# Patient Record
Sex: FEMALE | Race: WHITE | NOT HISPANIC OR LATINO
[De-identification: names, ages, dates, MRNs, and addresses within clinical notes are randomized per-mention and may not be internally consistent; named-entity substitution may affect disease eponyms.]

---

## 2017-05-16 PROBLEM — Z00.00 ENCOUNTER FOR PREVENTIVE HEALTH EXAMINATION: Status: ACTIVE | Noted: 2017-05-16

## 2017-05-23 ENCOUNTER — APPOINTMENT (OUTPATIENT)
Dept: VASCULAR SURGERY | Facility: CLINIC | Age: 82
End: 2017-05-23

## 2017-05-23 VITALS
SYSTOLIC BLOOD PRESSURE: 180 MMHG | DIASTOLIC BLOOD PRESSURE: 90 MMHG | HEIGHT: 63 IN | BODY MASS INDEX: 26.58 KG/M2 | WEIGHT: 150 LBS

## 2017-05-23 DIAGNOSIS — Z86.39 PERSONAL HISTORY OF OTHER ENDOCRINE, NUTRITIONAL AND METABOLIC DISEASE: ICD-10-CM

## 2017-05-23 DIAGNOSIS — Z86.79 PERSONAL HISTORY OF OTHER DISEASES OF THE CIRCULATORY SYSTEM: ICD-10-CM

## 2017-05-23 DIAGNOSIS — I71.2 THORACIC AORTIC ANEURYSM, W/OUT RUPTURE: ICD-10-CM

## 2017-05-23 DIAGNOSIS — Z87.891 PERSONAL HISTORY OF NICOTINE DEPENDENCE: ICD-10-CM

## 2017-05-23 DIAGNOSIS — E03.9 HYPOTHYROIDISM, UNSPECIFIED: ICD-10-CM

## 2017-05-23 RX ORDER — LEVOTHYROXINE SODIUM 25 UG/1
25 TABLET ORAL
Refills: 0 | Status: ACTIVE | COMMUNITY

## 2017-05-23 RX ORDER — ATORVASTATIN CALCIUM 10 MG/1
10 TABLET, FILM COATED ORAL
Refills: 0 | Status: ACTIVE | COMMUNITY

## 2017-05-23 RX ORDER — WARFARIN 6 MG/1
6 TABLET ORAL
Refills: 0 | Status: ACTIVE | COMMUNITY

## 2017-05-23 RX ORDER — URSODIOL 300 MG/1
300 CAPSULE ORAL
Refills: 0 | Status: ACTIVE | COMMUNITY

## 2017-05-23 RX ORDER — VALSARTAN 160 MG/1
160 TABLET ORAL
Refills: 0 | Status: ACTIVE | COMMUNITY

## 2017-05-23 RX ORDER — LEVOCETIRIZINE DIHYDROCHLORIDE 5 MG/1
5 TABLET ORAL
Refills: 0 | Status: ACTIVE | COMMUNITY

## 2017-05-23 RX ORDER — DILTIAZEM HYDROCHLORIDE 240 MG/1
240 CAPSULE, EXTENDED RELEASE ORAL
Refills: 0 | Status: ACTIVE | COMMUNITY

## 2017-05-23 RX ORDER — HYDROCHLOROTHIAZIDE 12.5 MG/1
12.5 CAPSULE ORAL
Refills: 0 | Status: ACTIVE | COMMUNITY

## 2017-05-24 ENCOUNTER — APPOINTMENT (OUTPATIENT)
Dept: HEMATOLOGY ONCOLOGY | Facility: CLINIC | Age: 82
End: 2017-05-24

## 2017-05-24 VITALS
DIASTOLIC BLOOD PRESSURE: 105 MMHG | HEART RATE: 85 BPM | RESPIRATION RATE: 14 BRPM | WEIGHT: 150 LBS | HEIGHT: 63 IN | TEMPERATURE: 95.6 F | BODY MASS INDEX: 26.58 KG/M2 | SYSTOLIC BLOOD PRESSURE: 162 MMHG

## 2017-05-30 ENCOUNTER — INPATIENT (INPATIENT)
Facility: HOSPITAL | Age: 82
LOS: 0 days | Discharge: HOME | End: 2017-05-31
Attending: SURGERY

## 2017-06-06 ENCOUNTER — APPOINTMENT (OUTPATIENT)
Dept: VASCULAR SURGERY | Facility: CLINIC | Age: 82
End: 2017-06-06

## 2017-06-06 RX ORDER — WARFARIN 1 MG/1
1 TABLET ORAL
Qty: 90 | Refills: 0 | Status: ACTIVE | COMMUNITY
Start: 2016-06-24

## 2017-06-06 RX ORDER — DILTIAZEM HYDROCHLORIDE 240 MG/1
240 CAPSULE, EXTENDED RELEASE ORAL
Qty: 90 | Refills: 0 | Status: ACTIVE | COMMUNITY
Start: 2016-06-24

## 2017-06-06 RX ORDER — TOBRAMYCIN AND DEXAMETHASONE 3; 1 MG/ML; MG/ML
0.3-0.1 SUSPENSION/ DROPS OPHTHALMIC
Qty: 5 | Refills: 0 | Status: ACTIVE | COMMUNITY
Start: 2017-03-20

## 2017-06-06 RX ORDER — TIOTROPIUM BROMIDE AND OLODATEROL 3.124; 2.736 UG/1; UG/1
2.5-2.5 SPRAY, METERED RESPIRATORY (INHALATION)
Qty: 4 | Refills: 0 | Status: ACTIVE | COMMUNITY
Start: 2017-02-13

## 2017-06-06 RX ORDER — WARFARIN 5 MG/1
5 TABLET ORAL
Qty: 90 | Refills: 0 | Status: ACTIVE | COMMUNITY
Start: 2016-09-21

## 2017-06-28 DIAGNOSIS — Z72.0 TOBACCO USE: ICD-10-CM

## 2017-06-28 DIAGNOSIS — E78.5 HYPERLIPIDEMIA, UNSPECIFIED: ICD-10-CM

## 2017-06-28 DIAGNOSIS — N28.9 DISORDER OF KIDNEY AND URETER, UNSPECIFIED: ICD-10-CM

## 2017-06-28 DIAGNOSIS — I48.91 UNSPECIFIED ATRIAL FIBRILLATION: ICD-10-CM

## 2017-06-28 DIAGNOSIS — I71.4 ABDOMINAL AORTIC ANEURYSM, WITHOUT RUPTURE: ICD-10-CM

## 2017-06-28 DIAGNOSIS — Z79.01 LONG TERM (CURRENT) USE OF ANTICOAGULANTS: ICD-10-CM

## 2017-06-28 DIAGNOSIS — E03.9 HYPOTHYROIDISM, UNSPECIFIED: ICD-10-CM

## 2017-06-28 DIAGNOSIS — Z88.0 ALLERGY STATUS TO PENICILLIN: ICD-10-CM

## 2017-06-29 ENCOUNTER — OUTPATIENT (OUTPATIENT)
Dept: OUTPATIENT SERVICES | Facility: HOSPITAL | Age: 82
LOS: 1 days | Discharge: HOME | End: 2017-06-29

## 2017-06-29 DIAGNOSIS — K92.2 GASTROINTESTINAL HEMORRHAGE, UNSPECIFIED: ICD-10-CM

## 2017-06-29 DIAGNOSIS — Z79.01 LONG TERM (CURRENT) USE OF ANTICOAGULANTS: ICD-10-CM

## 2017-06-29 DIAGNOSIS — I48.0 PAROXYSMAL ATRIAL FIBRILLATION: ICD-10-CM

## 2017-06-29 DIAGNOSIS — I71.4 ABDOMINAL AORTIC ANEURYSM, WITHOUT RUPTURE: ICD-10-CM

## 2017-07-13 ENCOUNTER — OUTPATIENT (OUTPATIENT)
Dept: OUTPATIENT SERVICES | Facility: HOSPITAL | Age: 82
LOS: 1 days | Discharge: HOME | End: 2017-07-13

## 2017-07-13 DIAGNOSIS — Z01.818 ENCOUNTER FOR OTHER PREPROCEDURAL EXAMINATION: ICD-10-CM

## 2017-07-13 DIAGNOSIS — C64.9 MALIGNANT NEOPLASM OF UNSPECIFIED KIDNEY, EXCEPT RENAL PELVIS: ICD-10-CM

## 2017-07-13 DIAGNOSIS — K92.2 GASTROINTESTINAL HEMORRHAGE, UNSPECIFIED: ICD-10-CM

## 2017-07-13 DIAGNOSIS — I71.4 ABDOMINAL AORTIC ANEURYSM, WITHOUT RUPTURE: ICD-10-CM

## 2017-07-24 ENCOUNTER — INPATIENT (INPATIENT)
Facility: HOSPITAL | Age: 82
LOS: 3 days | Discharge: HOME | End: 2017-07-28
Attending: SURGERY

## 2017-07-24 ENCOUNTER — APPOINTMENT (OUTPATIENT)
Dept: VASCULAR SURGERY | Facility: HOSPITAL | Age: 82
End: 2017-07-24
Payer: MEDICARE

## 2017-07-24 DIAGNOSIS — I71.4 ABDOMINAL AORTIC ANEURYSM, WITHOUT RUPTURE: ICD-10-CM

## 2017-07-24 DIAGNOSIS — K92.2 GASTROINTESTINAL HEMORRHAGE, UNSPECIFIED: ICD-10-CM

## 2017-07-24 PROCEDURE — 76937 US GUIDE VASCULAR ACCESS: CPT | Mod: 26

## 2017-07-24 PROCEDURE — 36251 INS CATH REN ART 1ST UNILAT: CPT | Mod: 59,LT

## 2017-07-24 PROCEDURE — 37242 VASC EMBOLIZE/OCCLUDE ARTERY: CPT

## 2017-07-26 ENCOUNTER — APPOINTMENT (OUTPATIENT)
Dept: VASCULAR SURGERY | Facility: HOSPITAL | Age: 82
End: 2017-07-26
Payer: MEDICARE

## 2017-07-26 PROCEDURE — 76937 US GUIDE VASCULAR ACCESS: CPT | Mod: 26

## 2017-07-26 PROCEDURE — 34842 ENDOVASC VISC AORTA 2 GRAFT: CPT

## 2017-07-26 PROCEDURE — 34845 VISC & INFRAREN ABD 1 PROSTH: CPT

## 2017-07-31 DIAGNOSIS — D68.59 OTHER PRIMARY THROMBOPHILIA: ICD-10-CM

## 2017-07-31 DIAGNOSIS — I87.2 VENOUS INSUFFICIENCY (CHRONIC) (PERIPHERAL): ICD-10-CM

## 2017-07-31 DIAGNOSIS — E78.5 HYPERLIPIDEMIA, UNSPECIFIED: ICD-10-CM

## 2017-07-31 DIAGNOSIS — E87.1 HYPO-OSMOLALITY AND HYPONATREMIA: ICD-10-CM

## 2017-07-31 DIAGNOSIS — E03.9 HYPOTHYROIDISM, UNSPECIFIED: ICD-10-CM

## 2017-07-31 DIAGNOSIS — N26.1 ATROPHY OF KIDNEY (TERMINAL): ICD-10-CM

## 2017-07-31 DIAGNOSIS — I10 ESSENTIAL (PRIMARY) HYPERTENSION: ICD-10-CM

## 2017-07-31 DIAGNOSIS — I48.91 UNSPECIFIED ATRIAL FIBRILLATION: ICD-10-CM

## 2017-07-31 DIAGNOSIS — N17.9 ACUTE KIDNEY FAILURE, UNSPECIFIED: ICD-10-CM

## 2017-07-31 DIAGNOSIS — M85.80 OTHER SPECIFIED DISORDERS OF BONE DENSITY AND STRUCTURE, UNSPECIFIED SITE: ICD-10-CM

## 2017-07-31 DIAGNOSIS — K74.3 PRIMARY BILIARY CIRRHOSIS: ICD-10-CM

## 2017-07-31 DIAGNOSIS — Z87.891 PERSONAL HISTORY OF NICOTINE DEPENDENCE: ICD-10-CM

## 2017-07-31 DIAGNOSIS — I71.4 ABDOMINAL AORTIC ANEURYSM, WITHOUT RUPTURE: ICD-10-CM

## 2017-07-31 DIAGNOSIS — M19.90 UNSPECIFIED OSTEOARTHRITIS, UNSPECIFIED SITE: ICD-10-CM

## 2017-07-31 DIAGNOSIS — J44.9 CHRONIC OBSTRUCTIVE PULMONARY DISEASE, UNSPECIFIED: ICD-10-CM

## 2017-08-01 ENCOUNTER — OUTPATIENT (OUTPATIENT)
Dept: OUTPATIENT SERVICES | Facility: HOSPITAL | Age: 82
LOS: 1 days | Discharge: HOME | End: 2017-08-01

## 2017-08-01 DIAGNOSIS — I71.4 ABDOMINAL AORTIC ANEURYSM, WITHOUT RUPTURE: ICD-10-CM

## 2017-08-01 DIAGNOSIS — E78.00 PURE HYPERCHOLESTEROLEMIA, UNSPECIFIED: ICD-10-CM

## 2017-08-01 DIAGNOSIS — K92.2 GASTROINTESTINAL HEMORRHAGE, UNSPECIFIED: ICD-10-CM

## 2017-08-01 DIAGNOSIS — Z79.01 LONG TERM (CURRENT) USE OF ANTICOAGULANTS: ICD-10-CM

## 2017-08-01 DIAGNOSIS — I48.0 PAROXYSMAL ATRIAL FIBRILLATION: ICD-10-CM

## 2017-08-01 DIAGNOSIS — Z85.528 PERSONAL HISTORY OF OTHER MALIGNANT NEOPLASM OF KIDNEY: ICD-10-CM

## 2017-08-01 DIAGNOSIS — I08.3 COMBINED RHEUMATIC DISORDERS OF MITRAL, AORTIC AND TRICUSPID VALVES: ICD-10-CM

## 2017-08-01 DIAGNOSIS — E56.1 DEFICIENCY OF VITAMIN K: ICD-10-CM

## 2017-08-07 ENCOUNTER — OUTPATIENT (OUTPATIENT)
Dept: OUTPATIENT SERVICES | Facility: HOSPITAL | Age: 82
LOS: 1 days | Discharge: HOME | End: 2017-08-07

## 2017-08-07 DIAGNOSIS — K92.2 GASTROINTESTINAL HEMORRHAGE, UNSPECIFIED: ICD-10-CM

## 2017-08-07 DIAGNOSIS — I48.0 PAROXYSMAL ATRIAL FIBRILLATION: ICD-10-CM

## 2017-08-07 DIAGNOSIS — I71.4 ABDOMINAL AORTIC ANEURYSM, WITHOUT RUPTURE: ICD-10-CM

## 2017-08-07 DIAGNOSIS — Z79.01 LONG TERM (CURRENT) USE OF ANTICOAGULANTS: ICD-10-CM

## 2017-08-10 ENCOUNTER — OUTPATIENT (OUTPATIENT)
Dept: OUTPATIENT SERVICES | Facility: HOSPITAL | Age: 82
LOS: 1 days | Discharge: HOME | End: 2017-08-10

## 2017-08-10 DIAGNOSIS — Z79.01 LONG TERM (CURRENT) USE OF ANTICOAGULANTS: ICD-10-CM

## 2017-08-10 DIAGNOSIS — I48.0 PAROXYSMAL ATRIAL FIBRILLATION: ICD-10-CM

## 2017-08-10 DIAGNOSIS — K92.2 GASTROINTESTINAL HEMORRHAGE, UNSPECIFIED: ICD-10-CM

## 2017-08-10 DIAGNOSIS — I71.4 ABDOMINAL AORTIC ANEURYSM, WITHOUT RUPTURE: ICD-10-CM

## 2017-08-15 ENCOUNTER — APPOINTMENT (OUTPATIENT)
Dept: VASCULAR SURGERY | Facility: CLINIC | Age: 82
End: 2017-08-15
Payer: MEDICARE

## 2017-08-15 PROCEDURE — 93978 VASCULAR STUDY: CPT

## 2017-08-15 PROCEDURE — 99024 POSTOP FOLLOW-UP VISIT: CPT

## 2017-08-16 ENCOUNTER — OUTPATIENT (OUTPATIENT)
Dept: OUTPATIENT SERVICES | Facility: HOSPITAL | Age: 82
LOS: 1 days | Discharge: HOME | End: 2017-08-16

## 2017-08-16 ENCOUNTER — APPOINTMENT (OUTPATIENT)
Dept: HEMATOLOGY ONCOLOGY | Facility: CLINIC | Age: 82
End: 2017-08-16
Payer: MEDICARE

## 2017-08-16 VITALS
HEIGHT: 63 IN | DIASTOLIC BLOOD PRESSURE: 90 MMHG | RESPIRATION RATE: 14 BRPM | BODY MASS INDEX: 26.22 KG/M2 | SYSTOLIC BLOOD PRESSURE: 153 MMHG | TEMPERATURE: 96 F | HEART RATE: 82 BPM | WEIGHT: 148 LBS

## 2017-08-16 DIAGNOSIS — K92.2 GASTROINTESTINAL HEMORRHAGE, UNSPECIFIED: ICD-10-CM

## 2017-08-16 DIAGNOSIS — D30.00 BENIGN NEOPLASM OF UNSPECIFIED KIDNEY: ICD-10-CM

## 2017-08-16 DIAGNOSIS — I71.4 ABDOMINAL AORTIC ANEURYSM, WITHOUT RUPTURE: ICD-10-CM

## 2017-08-16 LAB
ANION GAP SERPL CALC-SCNC: 13 MEQ/L
BUN SERPL-MCNC: 49 MG/DL
BUN/CREAT SERPL: 26.1 %
CALCIUM SERPL-MCNC: 9.6 MG/DL
CHLORIDE SERPL-SCNC: 99 MEQ/L
CO2 SERPL-SCNC: 25 MEQ/L
CREAT SERPL-MCNC: 1.88 MG/DL
GFR SERPL CREATININE-BSD FRML MDRD: 26
GLUCOSE SERPL-MCNC: 91 MG/DL
INR PPP: 3.1
POTASSIUM SERPL-SCNC: 4.6 MMOL/L
PROTHROMBIN TIME: 34.7 SEC
SODIUM SERPL-SCNC: 137 MEQ/L

## 2017-08-16 PROCEDURE — 99213 OFFICE O/P EST LOW 20 MIN: CPT

## 2017-08-17 DIAGNOSIS — C64.9 MALIGNANT NEOPLASM OF UNSPECIFIED KIDNEY, EXCEPT RENAL PELVIS: ICD-10-CM

## 2017-08-28 ENCOUNTER — OUTPATIENT (OUTPATIENT)
Dept: OUTPATIENT SERVICES | Facility: HOSPITAL | Age: 82
LOS: 1 days | Discharge: HOME | End: 2017-08-28

## 2017-08-28 DIAGNOSIS — I71.4 ABDOMINAL AORTIC ANEURYSM, WITHOUT RUPTURE: ICD-10-CM

## 2017-08-28 DIAGNOSIS — I48.0 PAROXYSMAL ATRIAL FIBRILLATION: ICD-10-CM

## 2017-08-28 DIAGNOSIS — K92.2 GASTROINTESTINAL HEMORRHAGE, UNSPECIFIED: ICD-10-CM

## 2017-08-28 DIAGNOSIS — Z79.01 LONG TERM (CURRENT) USE OF ANTICOAGULANTS: ICD-10-CM

## 2017-09-05 ENCOUNTER — OUTPATIENT (OUTPATIENT)
Dept: OUTPATIENT SERVICES | Facility: HOSPITAL | Age: 82
LOS: 1 days | Discharge: HOME | End: 2017-09-05

## 2017-09-05 DIAGNOSIS — Z79.01 LONG TERM (CURRENT) USE OF ANTICOAGULANTS: ICD-10-CM

## 2017-09-05 DIAGNOSIS — I48.0 PAROXYSMAL ATRIAL FIBRILLATION: ICD-10-CM

## 2017-09-05 DIAGNOSIS — K92.2 GASTROINTESTINAL HEMORRHAGE, UNSPECIFIED: ICD-10-CM

## 2017-09-05 DIAGNOSIS — I71.4 ABDOMINAL AORTIC ANEURYSM, WITHOUT RUPTURE: ICD-10-CM

## 2017-09-12 ENCOUNTER — OUTPATIENT (OUTPATIENT)
Dept: OUTPATIENT SERVICES | Facility: HOSPITAL | Age: 82
LOS: 1 days | Discharge: HOME | End: 2017-09-12

## 2017-09-12 DIAGNOSIS — I48.0 PAROXYSMAL ATRIAL FIBRILLATION: ICD-10-CM

## 2017-09-12 DIAGNOSIS — K92.2 GASTROINTESTINAL HEMORRHAGE, UNSPECIFIED: ICD-10-CM

## 2017-09-12 DIAGNOSIS — I71.4 ABDOMINAL AORTIC ANEURYSM, WITHOUT RUPTURE: ICD-10-CM

## 2017-09-18 ENCOUNTER — OUTPATIENT (OUTPATIENT)
Dept: OUTPATIENT SERVICES | Facility: HOSPITAL | Age: 82
LOS: 1 days | Discharge: HOME | End: 2017-09-18

## 2017-09-18 DIAGNOSIS — I48.0 PAROXYSMAL ATRIAL FIBRILLATION: ICD-10-CM

## 2017-09-18 DIAGNOSIS — I71.4 ABDOMINAL AORTIC ANEURYSM, WITHOUT RUPTURE: ICD-10-CM

## 2017-09-18 DIAGNOSIS — K92.2 GASTROINTESTINAL HEMORRHAGE, UNSPECIFIED: ICD-10-CM

## 2017-10-03 ENCOUNTER — OUTPATIENT (OUTPATIENT)
Dept: OUTPATIENT SERVICES | Facility: HOSPITAL | Age: 82
LOS: 1 days | Discharge: HOME | End: 2017-10-03

## 2017-10-03 DIAGNOSIS — K92.2 GASTROINTESTINAL HEMORRHAGE, UNSPECIFIED: ICD-10-CM

## 2017-10-03 DIAGNOSIS — I71.4 ABDOMINAL AORTIC ANEURYSM, WITHOUT RUPTURE: ICD-10-CM

## 2017-10-03 DIAGNOSIS — I48.0 PAROXYSMAL ATRIAL FIBRILLATION: ICD-10-CM

## 2017-10-16 ENCOUNTER — OUTPATIENT (OUTPATIENT)
Dept: OUTPATIENT SERVICES | Facility: HOSPITAL | Age: 82
LOS: 1 days | Discharge: HOME | End: 2017-10-16

## 2017-10-16 DIAGNOSIS — I48.0 PAROXYSMAL ATRIAL FIBRILLATION: ICD-10-CM

## 2017-10-16 DIAGNOSIS — I71.4 ABDOMINAL AORTIC ANEURYSM, WITHOUT RUPTURE: ICD-10-CM

## 2017-10-16 DIAGNOSIS — K92.2 GASTROINTESTINAL HEMORRHAGE, UNSPECIFIED: ICD-10-CM

## 2017-10-18 ENCOUNTER — INPATIENT (INPATIENT)
Facility: HOSPITAL | Age: 82
LOS: 7 days | Discharge: HOME | End: 2017-10-26
Attending: INTERNAL MEDICINE

## 2017-10-18 DIAGNOSIS — K92.2 GASTROINTESTINAL HEMORRHAGE, UNSPECIFIED: ICD-10-CM

## 2017-10-18 DIAGNOSIS — I71.4 ABDOMINAL AORTIC ANEURYSM, WITHOUT RUPTURE: ICD-10-CM

## 2017-10-30 ENCOUNTER — OUTPATIENT (OUTPATIENT)
Dept: OUTPATIENT SERVICES | Facility: HOSPITAL | Age: 82
LOS: 1 days | Discharge: HOME | End: 2017-10-30

## 2017-10-30 DIAGNOSIS — I48.0 PAROXYSMAL ATRIAL FIBRILLATION: ICD-10-CM

## 2017-10-30 DIAGNOSIS — Z87.891 PERSONAL HISTORY OF NICOTINE DEPENDENCE: ICD-10-CM

## 2017-10-30 DIAGNOSIS — Z79.01 LONG TERM (CURRENT) USE OF ANTICOAGULANTS: ICD-10-CM

## 2017-10-30 DIAGNOSIS — E78.5 HYPERLIPIDEMIA, UNSPECIFIED: ICD-10-CM

## 2017-10-30 DIAGNOSIS — Z79.02 LONG TERM (CURRENT) USE OF ANTITHROMBOTICS/ANTIPLATELETS: ICD-10-CM

## 2017-10-30 DIAGNOSIS — N18.3 CHRONIC KIDNEY DISEASE, STAGE 3 (MODERATE): ICD-10-CM

## 2017-10-30 DIAGNOSIS — K52.9 NONINFECTIVE GASTROENTERITIS AND COLITIS, UNSPECIFIED: ICD-10-CM

## 2017-10-30 DIAGNOSIS — I48.91 UNSPECIFIED ATRIAL FIBRILLATION: ICD-10-CM

## 2017-10-30 DIAGNOSIS — K92.2 GASTROINTESTINAL HEMORRHAGE, UNSPECIFIED: ICD-10-CM

## 2017-10-30 DIAGNOSIS — Z79.52 LONG TERM (CURRENT) USE OF SYSTEMIC STEROIDS: ICD-10-CM

## 2017-10-30 DIAGNOSIS — Z88.0 ALLERGY STATUS TO PENICILLIN: ICD-10-CM

## 2017-10-30 DIAGNOSIS — Z98.51 TUBAL LIGATION STATUS: ICD-10-CM

## 2017-10-30 DIAGNOSIS — K63.5 POLYP OF COLON: ICD-10-CM

## 2017-10-30 DIAGNOSIS — I12.9 HYPERTENSIVE CHRONIC KIDNEY DISEASE WITH STAGE 1 THROUGH STAGE 4 CHRONIC KIDNEY DISEASE, OR UNSPECIFIED CHRONIC KIDNEY DISEASE: ICD-10-CM

## 2017-10-30 DIAGNOSIS — R55 SYNCOPE AND COLLAPSE: ICD-10-CM

## 2017-10-30 DIAGNOSIS — E03.9 HYPOTHYROIDISM, UNSPECIFIED: ICD-10-CM

## 2017-10-30 DIAGNOSIS — I71.4 ABDOMINAL AORTIC ANEURYSM, WITHOUT RUPTURE: ICD-10-CM

## 2017-10-30 DIAGNOSIS — Z53.29 PROCEDURE AND TREATMENT NOT CARRIED OUT BECAUSE OF PATIENT'S DECISION FOR OTHER REASONS: ICD-10-CM

## 2017-10-30 DIAGNOSIS — D62 ACUTE POSTHEMORRHAGIC ANEMIA: ICD-10-CM

## 2017-10-30 DIAGNOSIS — A09 INFECTIOUS GASTROENTERITIS AND COLITIS, UNSPECIFIED: ICD-10-CM

## 2017-11-02 DIAGNOSIS — K63.3 ULCER OF INTESTINE: ICD-10-CM

## 2017-11-02 DIAGNOSIS — K64.8 OTHER HEMORRHOIDS: ICD-10-CM

## 2017-11-02 DIAGNOSIS — K57.30 DIVERTICULOSIS OF LARGE INTESTINE WITHOUT PERFORATION OR ABSCESS WITHOUT BLEEDING: ICD-10-CM

## 2017-11-07 ENCOUNTER — OUTPATIENT (OUTPATIENT)
Dept: OUTPATIENT SERVICES | Facility: HOSPITAL | Age: 82
LOS: 1 days | Discharge: HOME | End: 2017-11-07

## 2017-11-07 DIAGNOSIS — R19.7 DIARRHEA, UNSPECIFIED: ICD-10-CM

## 2017-11-07 DIAGNOSIS — I48.0 PAROXYSMAL ATRIAL FIBRILLATION: ICD-10-CM

## 2017-11-07 DIAGNOSIS — K92.2 GASTROINTESTINAL HEMORRHAGE, UNSPECIFIED: ICD-10-CM

## 2017-11-07 DIAGNOSIS — D64.9 ANEMIA, UNSPECIFIED: ICD-10-CM

## 2017-11-07 DIAGNOSIS — K62.5 HEMORRHAGE OF ANUS AND RECTUM: ICD-10-CM

## 2017-11-07 DIAGNOSIS — D50.9 IRON DEFICIENCY ANEMIA, UNSPECIFIED: ICD-10-CM

## 2017-11-07 DIAGNOSIS — I71.4 ABDOMINAL AORTIC ANEURYSM, WITHOUT RUPTURE: ICD-10-CM

## 2017-11-09 ENCOUNTER — TRANSCRIPTION ENCOUNTER (OUTPATIENT)
Age: 82
End: 2017-11-09

## 2017-11-20 ENCOUNTER — OUTPATIENT (OUTPATIENT)
Dept: OUTPATIENT SERVICES | Facility: HOSPITAL | Age: 82
LOS: 1 days | Discharge: HOME | End: 2017-11-20

## 2017-11-20 DIAGNOSIS — D64.9 ANEMIA, UNSPECIFIED: ICD-10-CM

## 2017-11-20 DIAGNOSIS — E53.8 DEFICIENCY OF OTHER SPECIFIED B GROUP VITAMINS: ICD-10-CM

## 2017-11-20 DIAGNOSIS — I48.0 PAROXYSMAL ATRIAL FIBRILLATION: ICD-10-CM

## 2017-11-20 DIAGNOSIS — E03.9 HYPOTHYROIDISM, UNSPECIFIED: ICD-10-CM

## 2017-11-20 DIAGNOSIS — N39.0 URINARY TRACT INFECTION, SITE NOT SPECIFIED: ICD-10-CM

## 2017-11-20 DIAGNOSIS — E78.2 MIXED HYPERLIPIDEMIA: ICD-10-CM

## 2017-11-20 DIAGNOSIS — D50.9 IRON DEFICIENCY ANEMIA, UNSPECIFIED: ICD-10-CM

## 2017-11-20 DIAGNOSIS — E55.9 VITAMIN D DEFICIENCY, UNSPECIFIED: ICD-10-CM

## 2017-11-20 DIAGNOSIS — K92.2 GASTROINTESTINAL HEMORRHAGE, UNSPECIFIED: ICD-10-CM

## 2017-11-20 DIAGNOSIS — I71.4 ABDOMINAL AORTIC ANEURYSM, WITHOUT RUPTURE: ICD-10-CM

## 2017-11-20 DIAGNOSIS — E11.9 TYPE 2 DIABETES MELLITUS WITHOUT COMPLICATIONS: ICD-10-CM

## 2017-11-28 ENCOUNTER — OUTPATIENT (OUTPATIENT)
Dept: OUTPATIENT SERVICES | Facility: HOSPITAL | Age: 82
LOS: 1 days | Discharge: HOME | End: 2017-11-28

## 2017-11-28 DIAGNOSIS — I48.0 PAROXYSMAL ATRIAL FIBRILLATION: ICD-10-CM

## 2017-11-28 DIAGNOSIS — K92.2 GASTROINTESTINAL HEMORRHAGE, UNSPECIFIED: ICD-10-CM

## 2017-11-28 DIAGNOSIS — I71.4 ABDOMINAL AORTIC ANEURYSM, WITHOUT RUPTURE: ICD-10-CM

## 2017-12-11 ENCOUNTER — OUTPATIENT (OUTPATIENT)
Dept: OUTPATIENT SERVICES | Facility: HOSPITAL | Age: 82
LOS: 1 days | Discharge: HOME | End: 2017-12-11

## 2017-12-11 DIAGNOSIS — K92.2 GASTROINTESTINAL HEMORRHAGE, UNSPECIFIED: ICD-10-CM

## 2017-12-11 DIAGNOSIS — I48.0 PAROXYSMAL ATRIAL FIBRILLATION: ICD-10-CM

## 2017-12-11 DIAGNOSIS — I71.4 ABDOMINAL AORTIC ANEURYSM, WITHOUT RUPTURE: ICD-10-CM

## 2017-12-19 ENCOUNTER — APPOINTMENT (OUTPATIENT)
Dept: VASCULAR SURGERY | Facility: CLINIC | Age: 82
End: 2017-12-19
Payer: MEDICARE

## 2017-12-19 ENCOUNTER — OUTPATIENT (OUTPATIENT)
Dept: OUTPATIENT SERVICES | Facility: HOSPITAL | Age: 82
LOS: 1 days | Discharge: HOME | End: 2017-12-19

## 2017-12-19 VITALS
BODY MASS INDEX: 24.8 KG/M2 | SYSTOLIC BLOOD PRESSURE: 120 MMHG | WEIGHT: 140 LBS | HEIGHT: 63 IN | DIASTOLIC BLOOD PRESSURE: 70 MMHG

## 2017-12-19 DIAGNOSIS — I48.0 PAROXYSMAL ATRIAL FIBRILLATION: ICD-10-CM

## 2017-12-19 DIAGNOSIS — I71.4 ABDOMINAL AORTIC ANEURYSM, WITHOUT RUPTURE: ICD-10-CM

## 2017-12-19 DIAGNOSIS — K92.2 GASTROINTESTINAL HEMORRHAGE, UNSPECIFIED: ICD-10-CM

## 2017-12-19 PROCEDURE — 93978 VASCULAR STUDY: CPT

## 2017-12-19 PROCEDURE — 99213 OFFICE O/P EST LOW 20 MIN: CPT

## 2018-01-03 ENCOUNTER — OUTPATIENT (OUTPATIENT)
Dept: OUTPATIENT SERVICES | Facility: HOSPITAL | Age: 83
LOS: 1 days | Discharge: HOME | End: 2018-01-03

## 2018-01-03 DIAGNOSIS — I71.4 ABDOMINAL AORTIC ANEURYSM, WITHOUT RUPTURE: ICD-10-CM

## 2018-01-03 DIAGNOSIS — I48.0 PAROXYSMAL ATRIAL FIBRILLATION: ICD-10-CM

## 2018-01-03 DIAGNOSIS — K92.2 GASTROINTESTINAL HEMORRHAGE, UNSPECIFIED: ICD-10-CM

## 2018-01-15 ENCOUNTER — OUTPATIENT (OUTPATIENT)
Dept: OUTPATIENT SERVICES | Facility: HOSPITAL | Age: 83
LOS: 1 days | Discharge: HOME | End: 2018-01-15

## 2018-01-15 DIAGNOSIS — I48.0 PAROXYSMAL ATRIAL FIBRILLATION: ICD-10-CM

## 2018-01-15 DIAGNOSIS — K92.2 GASTROINTESTINAL HEMORRHAGE, UNSPECIFIED: ICD-10-CM

## 2018-01-15 DIAGNOSIS — I71.4 ABDOMINAL AORTIC ANEURYSM, WITHOUT RUPTURE: ICD-10-CM

## 2018-02-12 ENCOUNTER — OUTPATIENT (OUTPATIENT)
Dept: OUTPATIENT SERVICES | Facility: HOSPITAL | Age: 83
LOS: 1 days | Discharge: HOME | End: 2018-02-12

## 2018-02-12 DIAGNOSIS — B50.9 PLASMODIUM FALCIPARUM MALARIA, UNSPECIFIED: ICD-10-CM

## 2018-02-12 DIAGNOSIS — I48.0 PAROXYSMAL ATRIAL FIBRILLATION: ICD-10-CM

## 2018-02-14 ENCOUNTER — OUTPATIENT (OUTPATIENT)
Dept: OUTPATIENT SERVICES | Facility: HOSPITAL | Age: 83
LOS: 1 days | Discharge: HOME | End: 2018-02-14

## 2018-02-14 DIAGNOSIS — D50.9 IRON DEFICIENCY ANEMIA, UNSPECIFIED: ICD-10-CM

## 2018-02-14 DIAGNOSIS — I48.0 PAROXYSMAL ATRIAL FIBRILLATION: ICD-10-CM

## 2018-02-14 DIAGNOSIS — R19.5 OTHER FECAL ABNORMALITIES: ICD-10-CM

## 2018-02-14 DIAGNOSIS — A04.72 ENTEROCOLITIS DUE TO CLOSTRIDIUM DIFFICILE, NOT SPECIFIED AS RECURRENT: ICD-10-CM

## 2018-02-15 DIAGNOSIS — I48.0 PAROXYSMAL ATRIAL FIBRILLATION: ICD-10-CM

## 2018-02-15 DIAGNOSIS — D50.9 IRON DEFICIENCY ANEMIA, UNSPECIFIED: ICD-10-CM

## 2018-02-15 DIAGNOSIS — A04.72 ENTEROCOLITIS DUE TO CLOSTRIDIUM DIFFICILE, NOT SPECIFIED AS RECURRENT: ICD-10-CM

## 2018-02-15 DIAGNOSIS — R19.5 OTHER FECAL ABNORMALITIES: ICD-10-CM

## 2018-02-20 ENCOUNTER — OUTPATIENT (OUTPATIENT)
Dept: OUTPATIENT SERVICES | Facility: HOSPITAL | Age: 83
LOS: 1 days | Discharge: HOME | End: 2018-02-20

## 2018-02-20 DIAGNOSIS — A04.72 ENTEROCOLITIS DUE TO CLOSTRIDIUM DIFFICILE, NOT SPECIFIED AS RECURRENT: ICD-10-CM

## 2018-02-20 DIAGNOSIS — I48.0 PAROXYSMAL ATRIAL FIBRILLATION: ICD-10-CM

## 2018-02-20 DIAGNOSIS — D50.9 IRON DEFICIENCY ANEMIA, UNSPECIFIED: ICD-10-CM

## 2018-02-20 DIAGNOSIS — R19.5 OTHER FECAL ABNORMALITIES: ICD-10-CM

## 2018-02-26 ENCOUNTER — OUTPATIENT (OUTPATIENT)
Dept: OUTPATIENT SERVICES | Facility: HOSPITAL | Age: 83
LOS: 1 days | Discharge: HOME | End: 2018-02-26

## 2018-02-26 DIAGNOSIS — D50.9 IRON DEFICIENCY ANEMIA, UNSPECIFIED: ICD-10-CM

## 2018-02-26 DIAGNOSIS — A04.72 ENTEROCOLITIS DUE TO CLOSTRIDIUM DIFFICILE, NOT SPECIFIED AS RECURRENT: ICD-10-CM

## 2018-02-26 DIAGNOSIS — I48.0 PAROXYSMAL ATRIAL FIBRILLATION: ICD-10-CM

## 2018-02-26 DIAGNOSIS — R19.5 OTHER FECAL ABNORMALITIES: ICD-10-CM

## 2018-02-27 ENCOUNTER — OUTPATIENT (OUTPATIENT)
Dept: OUTPATIENT SERVICES | Facility: HOSPITAL | Age: 83
LOS: 1 days | Discharge: HOME | End: 2018-02-27

## 2018-02-27 DIAGNOSIS — D50.9 IRON DEFICIENCY ANEMIA, UNSPECIFIED: ICD-10-CM

## 2018-02-27 DIAGNOSIS — I48.0 PAROXYSMAL ATRIAL FIBRILLATION: ICD-10-CM

## 2018-02-27 DIAGNOSIS — R19.5 OTHER FECAL ABNORMALITIES: ICD-10-CM

## 2018-02-27 DIAGNOSIS — A04.72 ENTEROCOLITIS DUE TO CLOSTRIDIUM DIFFICILE, NOT SPECIFIED AS RECURRENT: ICD-10-CM

## 2018-03-06 ENCOUNTER — OUTPATIENT (OUTPATIENT)
Dept: OUTPATIENT SERVICES | Facility: HOSPITAL | Age: 83
LOS: 1 days | Discharge: HOME | End: 2018-03-06

## 2018-03-06 DIAGNOSIS — D68.9 COAGULATION DEFECT, UNSPECIFIED: ICD-10-CM

## 2018-03-06 DIAGNOSIS — R19.4 CHANGE IN BOWEL HABIT: ICD-10-CM

## 2018-03-07 ENCOUNTER — APPOINTMENT (OUTPATIENT)
Dept: HEMATOLOGY ONCOLOGY | Facility: CLINIC | Age: 83
End: 2018-03-07
Payer: MEDICARE

## 2018-03-07 ENCOUNTER — OUTPATIENT (OUTPATIENT)
Dept: OUTPATIENT SERVICES | Facility: HOSPITAL | Age: 83
LOS: 1 days | Discharge: HOME | End: 2018-03-07

## 2018-03-07 VITALS
WEIGHT: 132 LBS | BODY MASS INDEX: 23.39 KG/M2 | DIASTOLIC BLOOD PRESSURE: 57 MMHG | TEMPERATURE: 95.8 F | HEART RATE: 89 BPM | HEIGHT: 63 IN | RESPIRATION RATE: 16 BRPM | SYSTOLIC BLOOD PRESSURE: 146 MMHG

## 2018-03-07 DIAGNOSIS — C64.9 MALIGNANT NEOPLASM OF UNSPECIFIED KIDNEY, EXCEPT RENAL PELVIS: ICD-10-CM

## 2018-03-07 PROCEDURE — 99213 OFFICE O/P EST LOW 20 MIN: CPT

## 2018-03-08 PROBLEM — C64.9 CANCER OF KIDNEY: Status: ACTIVE | Noted: 2017-05-24

## 2018-03-12 ENCOUNTER — OUTPATIENT (OUTPATIENT)
Dept: OUTPATIENT SERVICES | Facility: HOSPITAL | Age: 83
LOS: 1 days | Discharge: HOME | End: 2018-03-12

## 2018-03-12 DIAGNOSIS — I48.0 PAROXYSMAL ATRIAL FIBRILLATION: ICD-10-CM

## 2018-03-15 ENCOUNTER — OUTPATIENT (OUTPATIENT)
Dept: OUTPATIENT SERVICES | Facility: HOSPITAL | Age: 83
LOS: 1 days | Discharge: HOME | End: 2018-03-15

## 2018-03-15 DIAGNOSIS — C64.9 MALIGNANT NEOPLASM OF UNSPECIFIED KIDNEY, EXCEPT RENAL PELVIS: ICD-10-CM

## 2018-03-15 DIAGNOSIS — I48.0 PAROXYSMAL ATRIAL FIBRILLATION: ICD-10-CM

## 2018-03-19 ENCOUNTER — OUTPATIENT (OUTPATIENT)
Dept: OUTPATIENT SERVICES | Facility: HOSPITAL | Age: 83
LOS: 1 days | Discharge: HOME | End: 2018-03-19

## 2018-03-19 DIAGNOSIS — D68.9 COAGULATION DEFECT, UNSPECIFIED: ICD-10-CM

## 2018-03-19 DIAGNOSIS — I48.0 PAROXYSMAL ATRIAL FIBRILLATION: ICD-10-CM

## 2018-03-19 DIAGNOSIS — D64.9 ANEMIA, UNSPECIFIED: ICD-10-CM

## 2018-03-19 DIAGNOSIS — R19.7 DIARRHEA, UNSPECIFIED: ICD-10-CM

## 2018-03-19 DIAGNOSIS — D50.9 IRON DEFICIENCY ANEMIA, UNSPECIFIED: ICD-10-CM

## 2018-03-19 DIAGNOSIS — E78.2 MIXED HYPERLIPIDEMIA: ICD-10-CM

## 2018-03-19 DIAGNOSIS — E11.9 TYPE 2 DIABETES MELLITUS WITHOUT COMPLICATIONS: ICD-10-CM

## 2018-03-19 DIAGNOSIS — E03.9 HYPOTHYROIDISM, UNSPECIFIED: ICD-10-CM

## 2018-03-19 DIAGNOSIS — E53.8 DEFICIENCY OF OTHER SPECIFIED B GROUP VITAMINS: ICD-10-CM

## 2018-03-19 DIAGNOSIS — E55.9 VITAMIN D DEFICIENCY, UNSPECIFIED: ICD-10-CM

## 2018-03-19 DIAGNOSIS — N39.0 URINARY TRACT INFECTION, SITE NOT SPECIFIED: ICD-10-CM

## 2018-03-20 ENCOUNTER — APPOINTMENT (OUTPATIENT)
Dept: VASCULAR SURGERY | Facility: CLINIC | Age: 83
End: 2018-03-20
Payer: MEDICARE

## 2018-03-20 PROCEDURE — 99213 OFFICE O/P EST LOW 20 MIN: CPT

## 2018-03-20 PROCEDURE — 93978 VASCULAR STUDY: CPT

## 2018-03-22 ENCOUNTER — OUTPATIENT (OUTPATIENT)
Dept: OUTPATIENT SERVICES | Facility: HOSPITAL | Age: 83
LOS: 1 days | Discharge: HOME | End: 2018-03-22

## 2018-03-22 DIAGNOSIS — I48.0 PAROXYSMAL ATRIAL FIBRILLATION: ICD-10-CM

## 2018-03-22 DIAGNOSIS — D68.9 COAGULATION DEFECT, UNSPECIFIED: ICD-10-CM

## 2018-03-30 ENCOUNTER — OTHER (OUTPATIENT)
Age: 83
End: 2018-03-30

## 2018-04-03 ENCOUNTER — OUTPATIENT (OUTPATIENT)
Dept: OUTPATIENT SERVICES | Facility: HOSPITAL | Age: 83
LOS: 1 days | Discharge: HOME | End: 2018-04-03

## 2018-04-03 DIAGNOSIS — I48.0 PAROXYSMAL ATRIAL FIBRILLATION: ICD-10-CM

## 2018-04-03 DIAGNOSIS — R19.7 DIARRHEA, UNSPECIFIED: ICD-10-CM

## 2018-04-03 DIAGNOSIS — D68.9 COAGULATION DEFECT, UNSPECIFIED: ICD-10-CM

## 2018-04-09 ENCOUNTER — OUTPATIENT (OUTPATIENT)
Dept: OUTPATIENT SERVICES | Facility: HOSPITAL | Age: 83
LOS: 1 days | Discharge: HOME | End: 2018-04-09

## 2018-04-09 DIAGNOSIS — I48.0 PAROXYSMAL ATRIAL FIBRILLATION: ICD-10-CM

## 2018-04-09 DIAGNOSIS — D68.9 COAGULATION DEFECT, UNSPECIFIED: ICD-10-CM

## 2018-04-16 ENCOUNTER — OUTPATIENT (OUTPATIENT)
Dept: OUTPATIENT SERVICES | Facility: HOSPITAL | Age: 83
LOS: 1 days | Discharge: HOME | End: 2018-04-16

## 2018-04-16 DIAGNOSIS — D68.9 COAGULATION DEFECT, UNSPECIFIED: ICD-10-CM

## 2018-04-16 DIAGNOSIS — I48.0 PAROXYSMAL ATRIAL FIBRILLATION: ICD-10-CM

## 2018-04-23 ENCOUNTER — OUTPATIENT (OUTPATIENT)
Dept: OUTPATIENT SERVICES | Facility: HOSPITAL | Age: 83
LOS: 1 days | Discharge: HOME | End: 2018-04-23

## 2018-04-23 DIAGNOSIS — D68.9 COAGULATION DEFECT, UNSPECIFIED: ICD-10-CM

## 2018-04-23 DIAGNOSIS — I48.0 PAROXYSMAL ATRIAL FIBRILLATION: ICD-10-CM

## 2018-07-24 ENCOUNTER — OUTPATIENT (OUTPATIENT)
Dept: OUTPATIENT SERVICES | Facility: HOSPITAL | Age: 83
LOS: 1 days | Discharge: HOME | End: 2018-07-24

## 2018-07-24 DIAGNOSIS — E05.90 THYROTOXICOSIS, UNSPECIFIED WITHOUT THYROTOXIC CRISIS OR STORM: ICD-10-CM

## 2018-07-24 DIAGNOSIS — N39.0 URINARY TRACT INFECTION, SITE NOT SPECIFIED: ICD-10-CM

## 2018-07-24 DIAGNOSIS — D64.9 ANEMIA, UNSPECIFIED: ICD-10-CM

## 2018-07-24 DIAGNOSIS — D50.9 IRON DEFICIENCY ANEMIA, UNSPECIFIED: ICD-10-CM

## 2018-07-24 DIAGNOSIS — E55.9 VITAMIN D DEFICIENCY, UNSPECIFIED: ICD-10-CM

## 2018-07-24 DIAGNOSIS — E53.9 VITAMIN B DEFICIENCY, UNSPECIFIED: ICD-10-CM

## 2018-07-24 DIAGNOSIS — E11.9 TYPE 2 DIABETES MELLITUS WITHOUT COMPLICATIONS: ICD-10-CM

## 2018-07-24 DIAGNOSIS — Z00.00 ENCOUNTER FOR GENERAL ADULT MEDICAL EXAMINATION WITHOUT ABNORMAL FINDINGS: ICD-10-CM

## 2018-07-24 DIAGNOSIS — E78.5 HYPERLIPIDEMIA, UNSPECIFIED: ICD-10-CM

## 2018-09-05 ENCOUNTER — APPOINTMENT (OUTPATIENT)
Dept: HEMATOLOGY ONCOLOGY | Facility: CLINIC | Age: 83
End: 2018-09-05

## 2018-09-25 ENCOUNTER — APPOINTMENT (OUTPATIENT)
Dept: VASCULAR SURGERY | Facility: CLINIC | Age: 83
End: 2018-09-25
Payer: MEDICARE

## 2018-09-25 PROCEDURE — 99213 OFFICE O/P EST LOW 20 MIN: CPT

## 2018-09-25 PROCEDURE — 93978 VASCULAR STUDY: CPT

## 2018-11-28 ENCOUNTER — OUTPATIENT (OUTPATIENT)
Dept: OUTPATIENT SERVICES | Facility: HOSPITAL | Age: 83
LOS: 1 days | Discharge: HOME | End: 2018-11-28

## 2018-11-28 DIAGNOSIS — E53.8 DEFICIENCY OF OTHER SPECIFIED B GROUP VITAMINS: ICD-10-CM

## 2018-11-28 DIAGNOSIS — E78.2 MIXED HYPERLIPIDEMIA: ICD-10-CM

## 2018-11-28 DIAGNOSIS — E55.9 VITAMIN D DEFICIENCY, UNSPECIFIED: ICD-10-CM

## 2018-11-28 DIAGNOSIS — D64.9 ANEMIA, UNSPECIFIED: ICD-10-CM

## 2018-11-28 DIAGNOSIS — E11.9 TYPE 2 DIABETES MELLITUS WITHOUT COMPLICATIONS: ICD-10-CM

## 2018-11-28 DIAGNOSIS — D50.9 IRON DEFICIENCY ANEMIA, UNSPECIFIED: ICD-10-CM

## 2018-11-28 DIAGNOSIS — N39.0 URINARY TRACT INFECTION, SITE NOT SPECIFIED: ICD-10-CM

## 2018-11-28 DIAGNOSIS — E03.9 HYPOTHYROIDISM, UNSPECIFIED: ICD-10-CM

## 2019-04-11 ENCOUNTER — OUTPATIENT (OUTPATIENT)
Dept: OUTPATIENT SERVICES | Facility: HOSPITAL | Age: 84
LOS: 1 days | Discharge: HOME | End: 2019-04-11

## 2019-04-11 DIAGNOSIS — E05.90 THYROTOXICOSIS, UNSPECIFIED WITHOUT THYROTOXIC CRISIS OR STORM: ICD-10-CM

## 2019-04-11 DIAGNOSIS — D64.9 ANEMIA, UNSPECIFIED: ICD-10-CM

## 2019-04-11 DIAGNOSIS — D50.9 IRON DEFICIENCY ANEMIA, UNSPECIFIED: ICD-10-CM

## 2019-04-11 DIAGNOSIS — E78.5 HYPERLIPIDEMIA, UNSPECIFIED: ICD-10-CM

## 2019-04-11 DIAGNOSIS — E11.9 TYPE 2 DIABETES MELLITUS WITHOUT COMPLICATIONS: ICD-10-CM

## 2019-04-11 DIAGNOSIS — N39.0 URINARY TRACT INFECTION, SITE NOT SPECIFIED: ICD-10-CM

## 2019-04-11 DIAGNOSIS — E53.8 DEFICIENCY OF OTHER SPECIFIED B GROUP VITAMINS: ICD-10-CM

## 2019-05-21 ENCOUNTER — APPOINTMENT (OUTPATIENT)
Dept: VASCULAR SURGERY | Facility: CLINIC | Age: 84
End: 2019-05-21

## 2019-08-15 ENCOUNTER — OUTPATIENT (OUTPATIENT)
Dept: OUTPATIENT SERVICES | Facility: HOSPITAL | Age: 84
LOS: 1 days | Discharge: HOME | End: 2019-08-15

## 2019-08-15 DIAGNOSIS — E05.90 THYROTOXICOSIS, UNSPECIFIED WITHOUT THYROTOXIC CRISIS OR STORM: ICD-10-CM

## 2019-08-15 DIAGNOSIS — N39.0 URINARY TRACT INFECTION, SITE NOT SPECIFIED: ICD-10-CM

## 2019-08-15 DIAGNOSIS — E78.5 HYPERLIPIDEMIA, UNSPECIFIED: ICD-10-CM

## 2019-08-15 DIAGNOSIS — D50.9 IRON DEFICIENCY ANEMIA, UNSPECIFIED: ICD-10-CM

## 2019-08-15 DIAGNOSIS — D64.9 ANEMIA, UNSPECIFIED: ICD-10-CM

## 2019-08-15 DIAGNOSIS — E11.9 TYPE 2 DIABETES MELLITUS WITHOUT COMPLICATIONS: ICD-10-CM

## 2019-12-13 ENCOUNTER — OUTPATIENT (OUTPATIENT)
Dept: OUTPATIENT SERVICES | Facility: HOSPITAL | Age: 84
LOS: 1 days | Discharge: HOME | End: 2019-12-13

## 2019-12-13 DIAGNOSIS — N39.0 URINARY TRACT INFECTION, SITE NOT SPECIFIED: ICD-10-CM

## 2019-12-13 DIAGNOSIS — E61.1 IRON DEFICIENCY: ICD-10-CM

## 2019-12-13 DIAGNOSIS — D50.9 IRON DEFICIENCY ANEMIA, UNSPECIFIED: ICD-10-CM

## 2019-12-13 DIAGNOSIS — D64.9 ANEMIA, UNSPECIFIED: ICD-10-CM

## 2019-12-13 DIAGNOSIS — E11.9 TYPE 2 DIABETES MELLITUS WITHOUT COMPLICATIONS: ICD-10-CM

## 2019-12-13 DIAGNOSIS — E78.5 HYPERLIPIDEMIA, UNSPECIFIED: ICD-10-CM

## 2021-03-30 ENCOUNTER — APPOINTMENT (OUTPATIENT)
Dept: VASCULAR SURGERY | Facility: CLINIC | Age: 86
End: 2021-03-30
Payer: MEDICARE

## 2021-03-30 VITALS
WEIGHT: 145 LBS | SYSTOLIC BLOOD PRESSURE: 145 MMHG | HEART RATE: 92 BPM | BODY MASS INDEX: 25.69 KG/M2 | HEIGHT: 63 IN | DIASTOLIC BLOOD PRESSURE: 65 MMHG | TEMPERATURE: 96.6 F

## 2021-03-30 DIAGNOSIS — M79.89 OTHER SPECIFIED SOFT TISSUE DISORDERS: ICD-10-CM

## 2021-03-30 PROCEDURE — 93970 EXTREMITY STUDY: CPT

## 2021-03-30 PROCEDURE — 99213 OFFICE O/P EST LOW 20 MIN: CPT

## 2021-03-30 PROCEDURE — 99072 ADDL SUPL MATRL&STAF TM PHE: CPT

## 2021-03-30 NOTE — HISTORY OF PRESENT ILLNESS
[FreeTextEntry1] : Ms. 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 \par She presents for follow up. C/o leg swelling bilaterally.\par \par She cannot lay flat due to orthopnea.

## 2021-03-30 NOTE — CONSULT LETTER
[Dear  ___] : Dear  [unfilled], [Courtesy Letter:] : I had the pleasure of seeing your patient, [unfilled], in my office today. [Please see my note below.] : Please see my note below. [FreeTextEntry2] : Dear Dr. Migue Carbajal,

## 2021-03-30 NOTE — DATA REVIEWED
[FreeTextEntry1] : I performed a venous duplex which was medically necessary to evaluate for DVT. It showed no evidence of DVT in the lower extremity bilaterally.\par

## 2021-03-30 NOTE — ASSESSMENT
[FreeTextEntry1] : Ms. 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 \par She presents for follow up. C/o leg swelling bilaterally.\par \par Abdominal duplex was not performed as she was able to lay flat, c/o shortness of breath. Venous duplex today showed no evidence of DVT in the lower extremities bilaterally.\par \par I have advised her to continue with diuretic therapy and I will see her back in six months for aortic duplex.

## 2021-04-08 ENCOUNTER — INPATIENT (INPATIENT)
Facility: HOSPITAL | Age: 86
LOS: 9 days | Discharge: HOME HEALTH | End: 2021-04-18
Attending: FAMILY MEDICINE | Admitting: FAMILY MEDICINE
Payer: MEDICARE

## 2021-04-08 VITALS
HEART RATE: 70 BPM | DIASTOLIC BLOOD PRESSURE: 57 MMHG | OXYGEN SATURATION: 97 % | TEMPERATURE: 98 F | RESPIRATION RATE: 18 BRPM | SYSTOLIC BLOOD PRESSURE: 114 MMHG

## 2021-04-08 PROCEDURE — 99285 EMERGENCY DEPT VISIT HI MDM: CPT

## 2021-04-08 NOTE — ED ADULT TRIAGE NOTE - CHIEF COMPLAINT QUOTE
pt presents to ED with complaint of bloody BM, abd pain, swelling of BLE, pt also just diagnosed with C DIFF as per daughter

## 2021-04-09 LAB
ALBUMIN SERPL ELPH-MCNC: 2.8 G/DL — LOW (ref 3.5–5.2)
ALBUMIN SERPL ELPH-MCNC: 2.9 G/DL — LOW (ref 3.5–5.2)
ALP SERPL-CCNC: 100 U/L — SIGNIFICANT CHANGE UP (ref 30–115)
ALP SERPL-CCNC: 93 U/L — SIGNIFICANT CHANGE UP (ref 30–115)
ALT FLD-CCNC: 12 U/L — SIGNIFICANT CHANGE UP (ref 0–41)
ALT FLD-CCNC: 13 U/L — SIGNIFICANT CHANGE UP (ref 0–41)
ANION GAP SERPL CALC-SCNC: 11 MMOL/L — SIGNIFICANT CHANGE UP (ref 7–14)
ANION GAP SERPL CALC-SCNC: 12 MMOL/L — SIGNIFICANT CHANGE UP (ref 7–14)
APTT BLD: 35.5 SEC — SIGNIFICANT CHANGE UP (ref 27–39.2)
AST SERPL-CCNC: 24 U/L — SIGNIFICANT CHANGE UP (ref 0–41)
AST SERPL-CCNC: 26 U/L — SIGNIFICANT CHANGE UP (ref 0–41)
BASOPHILS # BLD AUTO: 0.03 K/UL — SIGNIFICANT CHANGE UP (ref 0–0.2)
BASOPHILS # BLD AUTO: 0.04 K/UL — SIGNIFICANT CHANGE UP (ref 0–0.2)
BASOPHILS # BLD AUTO: 0.05 K/UL — SIGNIFICANT CHANGE UP (ref 0–0.2)
BASOPHILS NFR BLD AUTO: 0.2 % — SIGNIFICANT CHANGE UP (ref 0–1)
BASOPHILS NFR BLD AUTO: 0.3 % — SIGNIFICANT CHANGE UP (ref 0–1)
BASOPHILS NFR BLD AUTO: 0.3 % — SIGNIFICANT CHANGE UP (ref 0–1)
BILIRUB SERPL-MCNC: 0.9 MG/DL — SIGNIFICANT CHANGE UP (ref 0.2–1.2)
BILIRUB SERPL-MCNC: 1 MG/DL — SIGNIFICANT CHANGE UP (ref 0.2–1.2)
BLD GP AB SCN SERPL QL: SIGNIFICANT CHANGE UP
BLD GP AB SCN SERPL QL: SIGNIFICANT CHANGE UP
BUN SERPL-MCNC: 40 MG/DL — HIGH (ref 10–20)
BUN SERPL-MCNC: 40 MG/DL — HIGH (ref 10–20)
BUN SERPL-MCNC: 42 MG/DL — HIGH (ref 10–20)
CALCIUM SERPL-MCNC: 8.7 MG/DL — SIGNIFICANT CHANGE UP (ref 8.5–10.1)
CALCIUM SERPL-MCNC: 9 MG/DL — SIGNIFICANT CHANGE UP (ref 8.5–10.1)
CHLORIDE SERPL-SCNC: 97 MMOL/L — LOW (ref 98–110)
CHLORIDE SERPL-SCNC: 99 MMOL/L — SIGNIFICANT CHANGE UP (ref 98–110)
CK MB CFR SERPL CALC: 3 NG/ML — SIGNIFICANT CHANGE UP (ref 0.6–6.3)
CK SERPL-CCNC: 140 U/L — SIGNIFICANT CHANGE UP (ref 0–225)
CO2 SERPL-SCNC: 24 MMOL/L — SIGNIFICANT CHANGE UP (ref 17–32)
CO2 SERPL-SCNC: 24 MMOL/L — SIGNIFICANT CHANGE UP (ref 17–32)
CREAT SERPL-MCNC: 2.3 MG/DL — HIGH (ref 0.7–1.5)
CREAT SERPL-MCNC: 2.5 MG/DL — HIGH (ref 0.7–1.5)
EOSINOPHIL # BLD AUTO: 0 K/UL — SIGNIFICANT CHANGE UP (ref 0–0.7)
EOSINOPHIL # BLD AUTO: 0.08 K/UL — SIGNIFICANT CHANGE UP (ref 0–0.7)
EOSINOPHIL # BLD AUTO: 0.11 K/UL — SIGNIFICANT CHANGE UP (ref 0–0.7)
EOSINOPHIL NFR BLD AUTO: 0 % — SIGNIFICANT CHANGE UP (ref 0–8)
EOSINOPHIL NFR BLD AUTO: 0.6 % — SIGNIFICANT CHANGE UP (ref 0–8)
EOSINOPHIL NFR BLD AUTO: 0.8 % — SIGNIFICANT CHANGE UP (ref 0–8)
GLUCOSE SERPL-MCNC: 76 MG/DL — SIGNIFICANT CHANGE UP (ref 70–99)
GLUCOSE SERPL-MCNC: 86 MG/DL — SIGNIFICANT CHANGE UP (ref 70–99)
HCT VFR BLD CALC: 22 % — LOW (ref 37–47)
HCT VFR BLD CALC: 22.4 % — LOW (ref 37–47)
HCT VFR BLD CALC: 23.2 % — LOW (ref 37–47)
HCT VFR BLD CALC: 23.9 % — LOW (ref 37–47)
HGB BLD-MCNC: 7.3 G/DL — LOW (ref 12–16)
HGB BLD-MCNC: 7.3 G/DL — LOW (ref 12–16)
HGB BLD-MCNC: 7.8 G/DL — LOW (ref 12–16)
HGB BLD-MCNC: 7.9 G/DL — LOW (ref 12–16)
IMM GRANULOCYTES NFR BLD AUTO: 0.8 % — HIGH (ref 0.1–0.3)
IMM GRANULOCYTES NFR BLD AUTO: 0.9 % — HIGH (ref 0.1–0.3)
IMM GRANULOCYTES NFR BLD AUTO: 1 % — HIGH (ref 0.1–0.3)
INR BLD: 3.52 RATIO — HIGH (ref 0.65–1.3)
LACTATE SERPL-SCNC: 1.1 MMOL/L — SIGNIFICANT CHANGE UP (ref 0.7–2)
LIDOCAIN IGE QN: 25 U/L — SIGNIFICANT CHANGE UP (ref 7–60)
LYMPHOCYTES # BLD AUTO: 0.29 K/UL — LOW (ref 1.2–3.4)
LYMPHOCYTES # BLD AUTO: 0.74 K/UL — LOW (ref 1.2–3.4)
LYMPHOCYTES # BLD AUTO: 0.88 K/UL — LOW (ref 1.2–3.4)
LYMPHOCYTES # BLD AUTO: 1.9 % — LOW (ref 20.5–51.1)
LYMPHOCYTES # BLD AUTO: 5.1 % — LOW (ref 20.5–51.1)
LYMPHOCYTES # BLD AUTO: 6.6 % — LOW (ref 20.5–51.1)
MCHC RBC-ENTMCNC: 29.3 PG — SIGNIFICANT CHANGE UP (ref 27–31)
MCHC RBC-ENTMCNC: 29.6 PG — SIGNIFICANT CHANGE UP (ref 27–31)
MCHC RBC-ENTMCNC: 30.2 PG — SIGNIFICANT CHANGE UP (ref 27–31)
MCHC RBC-ENTMCNC: 30.8 PG — SIGNIFICANT CHANGE UP (ref 27–31)
MCHC RBC-ENTMCNC: 32.6 G/DL — SIGNIFICANT CHANGE UP (ref 32–37)
MCHC RBC-ENTMCNC: 33.1 G/DL — SIGNIFICANT CHANGE UP (ref 32–37)
MCHC RBC-ENTMCNC: 33.2 G/DL — SIGNIFICANT CHANGE UP (ref 32–37)
MCHC RBC-ENTMCNC: 33.6 G/DL — SIGNIFICANT CHANGE UP (ref 32–37)
MCV RBC AUTO: 89.5 FL — SIGNIFICANT CHANGE UP (ref 81–99)
MCV RBC AUTO: 90 FL — SIGNIFICANT CHANGE UP (ref 81–99)
MCV RBC AUTO: 90.9 FL — SIGNIFICANT CHANGE UP (ref 81–99)
MCV RBC AUTO: 91.7 FL — SIGNIFICANT CHANGE UP (ref 81–99)
MONOCYTES # BLD AUTO: 0.11 K/UL — SIGNIFICANT CHANGE UP (ref 0.1–0.6)
MONOCYTES # BLD AUTO: 0.98 K/UL — HIGH (ref 0.1–0.6)
MONOCYTES # BLD AUTO: 1.06 K/UL — HIGH (ref 0.1–0.6)
MONOCYTES NFR BLD AUTO: 0.7 % — LOW (ref 1.7–9.3)
MONOCYTES NFR BLD AUTO: 6.8 % — SIGNIFICANT CHANGE UP (ref 1.7–9.3)
MONOCYTES NFR BLD AUTO: 8 % — SIGNIFICANT CHANGE UP (ref 1.7–9.3)
NEUTROPHILS # BLD AUTO: 11.1 K/UL — HIGH (ref 1.4–6.5)
NEUTROPHILS # BLD AUTO: 12.51 K/UL — HIGH (ref 1.4–6.5)
NEUTROPHILS # BLD AUTO: 14.6 K/UL — HIGH (ref 1.4–6.5)
NEUTROPHILS NFR BLD AUTO: 83.5 % — HIGH (ref 42.2–75.2)
NEUTROPHILS NFR BLD AUTO: 86.2 % — HIGH (ref 42.2–75.2)
NEUTROPHILS NFR BLD AUTO: 96.3 % — HIGH (ref 42.2–75.2)
NRBC # BLD: 0 /100 WBCS — SIGNIFICANT CHANGE UP (ref 0–0)
NT-PROBNP SERPL-SCNC: 6785 PG/ML — HIGH (ref 0–300)
PLATELET # BLD AUTO: 254 K/UL — SIGNIFICANT CHANGE UP (ref 130–400)
PLATELET # BLD AUTO: 257 K/UL — SIGNIFICANT CHANGE UP (ref 130–400)
PLATELET # BLD AUTO: 263 K/UL — SIGNIFICANT CHANGE UP (ref 130–400)
PLATELET # BLD AUTO: 280 K/UL — SIGNIFICANT CHANGE UP (ref 130–400)
POTASSIUM SERPL-MCNC: 3.3 MMOL/L — LOW (ref 3.5–5)
POTASSIUM SERPL-MCNC: 3.8 MMOL/L — SIGNIFICANT CHANGE UP (ref 3.5–5)
POTASSIUM SERPL-SCNC: 3.3 MMOL/L — LOW (ref 3.5–5)
POTASSIUM SERPL-SCNC: 3.8 MMOL/L — SIGNIFICANT CHANGE UP (ref 3.5–5)
PROT SERPL-MCNC: 4.9 G/DL — LOW (ref 6–8)
PROT SERPL-MCNC: 5.7 G/DL — LOW (ref 6–8)
PROTHROM AB SERPL-ACNC: >40 SEC — HIGH (ref 9.95–12.87)
RAPID RVP RESULT: SIGNIFICANT CHANGE UP
RBC # BLD: 2.42 M/UL — LOW (ref 4.2–5.4)
RBC # BLD: 2.49 M/UL — LOW (ref 4.2–5.4)
RBC # BLD: 2.53 M/UL — LOW (ref 4.2–5.4)
RBC # BLD: 2.67 M/UL — LOW (ref 4.2–5.4)
RBC # FLD: 16.4 % — HIGH (ref 11.5–14.5)
RBC # FLD: 16.5 % — HIGH (ref 11.5–14.5)
RBC # FLD: 16.6 % — HIGH (ref 11.5–14.5)
RBC # FLD: 16.6 % — HIGH (ref 11.5–14.5)
SARS-COV-2 RNA SPEC QL NAA+PROBE: SIGNIFICANT CHANGE UP
SODIUM SERPL-SCNC: 133 MMOL/L — LOW (ref 135–146)
SODIUM SERPL-SCNC: 134 MMOL/L — LOW (ref 135–146)
TROPONIN T SERPL-MCNC: 0.05 NG/ML — CRITICAL HIGH
TROPONIN T SERPL-MCNC: 0.06 NG/ML — CRITICAL HIGH
WBC # BLD: 12.45 K/UL — HIGH (ref 4.8–10.8)
WBC # BLD: 13.3 K/UL — HIGH (ref 4.8–10.8)
WBC # BLD: 14.5 K/UL — HIGH (ref 4.8–10.8)
WBC # BLD: 15.16 K/UL — HIGH (ref 4.8–10.8)
WBC # FLD AUTO: 12.45 K/UL — HIGH (ref 4.8–10.8)
WBC # FLD AUTO: 13.3 K/UL — HIGH (ref 4.8–10.8)
WBC # FLD AUTO: 14.5 K/UL — HIGH (ref 4.8–10.8)
WBC # FLD AUTO: 15.16 K/UL — HIGH (ref 4.8–10.8)

## 2021-04-09 PROCEDURE — 93010 ELECTROCARDIOGRAM REPORT: CPT

## 2021-04-09 PROCEDURE — 99223 1ST HOSP IP/OBS HIGH 75: CPT

## 2021-04-09 PROCEDURE — 93306 TTE W/DOPPLER COMPLETE: CPT | Mod: 26

## 2021-04-09 RX ORDER — POTASSIUM CHLORIDE 20 MEQ
40 PACKET (EA) ORAL ONCE
Refills: 0 | Status: COMPLETED | OUTPATIENT
Start: 2021-04-09 | End: 2021-04-09

## 2021-04-09 RX ORDER — ATORVASTATIN CALCIUM 80 MG/1
10 TABLET, FILM COATED ORAL DAILY
Refills: 0 | Status: DISCONTINUED | OUTPATIENT
Start: 2021-04-09 | End: 2021-04-18

## 2021-04-09 RX ORDER — LEVOTHYROXINE SODIUM 125 MCG
25 TABLET ORAL DAILY
Refills: 0 | Status: DISCONTINUED | OUTPATIENT
Start: 2021-04-09 | End: 2021-04-18

## 2021-04-09 RX ORDER — SODIUM CHLORIDE 9 MG/ML
1000 INJECTION INTRAMUSCULAR; INTRAVENOUS; SUBCUTANEOUS
Refills: 0 | Status: DISCONTINUED | OUTPATIENT
Start: 2021-04-09 | End: 2021-04-09

## 2021-04-09 RX ORDER — VALSARTAN 80 MG/1
160 TABLET ORAL DAILY
Refills: 0 | Status: DISCONTINUED | OUTPATIENT
Start: 2021-04-09 | End: 2021-04-10

## 2021-04-09 RX ORDER — DILTIAZEM HCL 120 MG
240 CAPSULE, EXT RELEASE 24 HR ORAL DAILY
Refills: 0 | Status: DISCONTINUED | OUTPATIENT
Start: 2021-04-09 | End: 2021-04-18

## 2021-04-09 RX ORDER — FUROSEMIDE 40 MG
40 TABLET ORAL ONCE
Refills: 0 | Status: COMPLETED | OUTPATIENT
Start: 2021-04-09 | End: 2021-04-09

## 2021-04-09 RX ORDER — VANCOMYCIN HCL 1 G
125 VIAL (EA) INTRAVENOUS EVERY 6 HOURS
Refills: 0 | Status: COMPLETED | OUTPATIENT
Start: 2021-04-09 | End: 2021-04-15

## 2021-04-09 RX ORDER — ACETAMINOPHEN 500 MG
650 TABLET ORAL EVERY 4 HOURS
Refills: 0 | Status: DISCONTINUED | OUTPATIENT
Start: 2021-04-09 | End: 2021-04-18

## 2021-04-09 RX ORDER — SODIUM CHLORIDE 9 MG/ML
1000 INJECTION, SOLUTION INTRAVENOUS
Refills: 0 | Status: DISCONTINUED | OUTPATIENT
Start: 2021-04-09 | End: 2021-04-09

## 2021-04-09 RX ADMIN — SODIUM CHLORIDE 75 MILLILITER(S): 9 INJECTION, SOLUTION INTRAVENOUS at 11:37

## 2021-04-09 RX ADMIN — Medication 650 MILLIGRAM(S): at 11:38

## 2021-04-09 RX ADMIN — Medication 40 MILLIEQUIVALENT(S): at 04:06

## 2021-04-09 RX ADMIN — Medication 40 MILLIGRAM(S): at 12:25

## 2021-04-09 RX ADMIN — Medication 30 MILLIGRAM(S): at 17:01

## 2021-04-09 RX ADMIN — Medication 125 MILLIGRAM(S): at 17:01

## 2021-04-09 NOTE — ED ADULT NURSE NOTE - OBJECTIVE STATEMENT
Pt is c/o blood in her stool with clot for 2weeks now. But yesterday was too much blood after BM. Pt also has weakness and edema from thigh down to her feet 3+. pt daughter said her Mom was told he has low hemoglobin. No fever, chills.

## 2021-04-09 NOTE — H&P ADULT - NSHPLABSRESULTS_GEN_ALL_CORE
LABS                        7.9    14.50 )-----------( 280      ( 09 Apr 2021 00:50 )             23.9     04-09    133<L>  |  97<L>  |  40<H>  ----------------------------<  86  3.3<L>   |  24  |  2.5<H>    Ca    9.0      09 Apr 2021 00:50    TPro  5.7<L>  /  Alb  2.9<L>  /  TBili  1.0  /  DBili  x   /  AST  26  /  ALT  13  /  AlkPhos  100  04-09    PT/INR - ( 09 Apr 2021 00:50 )   PT: >40.00 sec;   INR: 3.52 ratio    PTT - ( 09 Apr 2021 00:50 )  PTT:35.5 sec    Lactate, Blood: 1.1 mmol/L (04-09-21 @ 00:50)  Troponin T, Serum: 0.06 ng/mL *HH* (04-09-21 @ 00:50)      CARDIAC MARKERS ( 09 Apr 2021 00:50 )  x     / 0.06 ng/mL / x     / x     / x          RADIOLOGY

## 2021-04-09 NOTE — H&P ADULT - HISTORY OF PRESENT ILLNESS
86 y/o F PMHx HLD, HTN, A-fib on Eliquis, hypothyroidism, CHF, recent C. diff diagnosis 4 days ago started on PO Vancomycin, has been compliant, presents to ED with bright red blood per rectum x1 day.     Pt denies fevers, CP, SOB, vomiting. Pt having multiple episodes of diarrhea daily now with blood starting tonight, has been having diarrhea for about 3 weeks.    In the ED, vital signs:   /57, HR 70, RR 18, T 98.4, SpO2 97% on room air  MARI (+), Hgb 7.9, Na 133, K+3.3 84 y/o F PMHx HLD, HTN, A-fib on Eliquis, hypothyroidism, CHF, recent C. diff diagnosis started on PO Vancomycin by PCP on Monday, has been compliant, presents to ED with bright red blood per rectum x1 day.     Patient is accompanied by daughter at bedside who states that the patients toilet was filled with bright red blood on 4 separate instances of diarrhea yesterday. Patient was having non-bloody diarrhea for 3 weeks prior to being started on Vancomycin.    Pt denies fevers, CP, SOB, vomiting, dizziness, paresthesias. Patient has had 1 episode of slightly bloody diarrhea since admission.     In the ED, vital signs:   /57, HR 70, RR 18, T 98.4, SpO2 97% on room air  MARI (+), Hgb 7.9, Na 133, K+3.3 86 y/o F PMHx HLD, HTN, A-fib on Eliquis, hypothyroidism, CHF, AAA s/p EVAR (2017), left renal artery coil embolization, right renal artery stent and SMA stent (2017), with recent C. diff diagnosis started on PO Vancomycin by PCP on Monday, has been compliant, presents to ED with bright red blood per rectum x1 day.     Patient is accompanied by daughter at bedside who states that the patients toilet was filled with bright red blood on 4 separate instances of diarrhea yesterday. Patient was having non-bloody diarrhea for 3 weeks prior to being started on Vancomycin.    Pt denies fevers, CP, SOB, vomiting, dizziness, paresthesias. Patient has had 1 episode of slightly bloody diarrhea since admission.     In the ED, vital signs:   /57, HR 70, RR 18, T 98.4, SpO2 97% on room air  MARI (+), Hgb 7.9, Na 133, K+3.3

## 2021-04-09 NOTE — CONSULT NOTE ADULT - ASSESSMENT
84 y/o F PMHx HLD, HTN, A-fib on Eliquis, hypothyroidism, CHF, AAA s/p EVAR (2017), left renal artery coil embolization, right renal artery stent and SMA stent (2017), with recent C. diff diagnosis started on PO Vancomycin by PCP on Monday, has been compliant, presents to ED with bright red blood per rectum x1 day.       #non complicated C. Diff :   resolving , continue with Vancomycin 125 mg PO QID for total of 14 days   no fever , WBC trending down     #fresh blood per rectum( resolved)  : r/o bleeding related to C. DIFF versus hemorrhoidal versus diverticular bleed versus others   HD stable   No significant change in HGB   REC:  patient does not want colonoscopy for now understanding the risks and benefits , if HGB is stable and no further bleeding , please resume anticoagulation , if bleeding worsened will reoffer patient Colonoscopy   trend CBC   keep HBG above 8  Clear liquid diet and advance as tolerated if stable HGB   2 large 18 gauge IV    84 y/o F PMHx HLD, HTN, A-fib on Eliquis, hypothyroidism, CHF, AAA s/p EVAR (2017), left renal artery coil embolization, right renal artery stent and SMA stent (2017), with recent C. diff diagnosis started on PO Vancomycin by PCP on Monday, has been compliant, presents to ED with bright red blood per rectum x1 day.       #non complicated C. Diff :   resolving , continue with Vancomycin 125 mg PO QID for total of 14 days   no fever , WBC trending down     #fresh blood per rectum( resolved)  : r/o bleeding related to C. DIFF versus hemorrhoidal versus diverticular bleed versus others   HD stable   No significant change in HGB     REC:  patient does not want colonoscopy for now understanding the risks and benefits , if HGB is stable and no further bleeding , please resume anticoagulation , if bleeding worsened will reoffer patient Colonoscopy   trend CBC   keep HBG above 8  Clear liquid diet and advance as tolerated if stable HGB   2 large 18 gauge IV

## 2021-04-09 NOTE — ED ADULT NURSE NOTE - INTERVENTIONS DEFINITIONS
[place] to call system/Call bell, personal items and telephone within reach/Instruct patient to call for assistance/Room bathroom lighting operational/Non-slip footwear when patient is off stretcher/Physically safe environment: no spills, clutter or unnecessary equipment/Stretcher in lowest position, wheels locked, appropriate side rails in place/Provide visual cue, wrist band, yellow gown, etc./Monitor gait and stability/Monitor for mental status changes and reorient to person, place, and time/Review medications for side effects contributing to fall risk/Reinforce activity limits and safety measures with patient and family

## 2021-04-09 NOTE — ED PROVIDER NOTE - OBJECTIVE STATEMENT
86 y/o F PMHx HLD, HTN, a-fib on Eliquis, hypothyroidism, CHF, recent C. diff diagnosis 4 days ago started on PO Vancomycin, has been compliant, presents to ED with bright red blood per rectum x1 day. Pt denies fevers, CP, SOB, vomiting. Pt having multiple episodes of diarrhea daily now with blood starting tonight, has been having diarrhea for about 3 weeks.

## 2021-04-09 NOTE — H&P ADULT - NSICDXPASTMEDICALHX_GEN_ALL_CORE_FT
PAST MEDICAL HISTORY:  Arthritis, gouty     Heart failure     Hypertension     Stage 4 chronic kidney disease

## 2021-04-09 NOTE — CONSULT NOTE ADULT - SUBJECTIVE AND OBJECTIVE BOX
Gastroenterology Consultation:    Patient is a 85y old  Female who presents with a chief complaint of GI bleed (09 Apr 2021 10:41)      Admitted on: 04-09-21  HPI:  86 y/o F PMHx HLD, HTN, A-fib on Eliquis, hypothyroidism, CHF, AAA s/p EVAR (2017), left renal artery coil embolization, right renal artery stent and SMA stent (2017), with recent C. diff diagnosis started on PO Vancomycin by PCP on Monday, has been compliant, presents to ED with bright red blood per rectum x1 day.     Patient is accompanied by daughter at bedside who states that the patients toilet was filled with bright red blood on 4 separate instances of diarrhea yesterday. Patient was having non-bloody diarrhea for 3 weeks prior to being started on Vancomycin. Today patient diarrhea improved and no further fresh blood with bowel movements . To note , patient last colonoscopy 2017 showing severe colitis in descending and sigmoid colon , mild colitis in the transverse and ascending colon , small ulcer descending colon , diverticulosis , and hemorrhoids . Pathology showed acute cryptitis and abscess .   Pt denies fevers, CP, SOB, vomiting, denies abdominal pain.     In the ED, vital signs:   /57, HR 70, RR 18, T 98.4, SpO2 97% on room air  MARI (+), Hgb 7.9, Na 133, K+3.3 (09 Apr 2021 10:41)      Prior records Reviewed (Y/N): Y  History obtained from person other than patient (Y/N): Y        PAST MEDICAL & SURGICAL HISTORY:  Heart failure    Stage 4 chronic kidney disease    Hypertension    Arthritis, gouty        FAMILY HISTORY: No pertinent family history         Home Medications:  atorvastatin 10 mg oral tablet: 1 tab(s) orally once a day (09 Apr 2021 11:54)  dilTIAZem 240 mg/24 hours oral tablet, extended release: 1 tab(s) orally once a day (09 Apr 2021 11:54)  Eliquis 5 mg oral tablet: 1 tab(s) orally 2 times a day (09 Apr 2021 11:54)  furosemide 40 mg oral tablet: 1 tab(s) orally once a day (09 Apr 2021 11:54)  Synthroid 25 mcg (0.025 mg) oral tablet: 1 tab(s) orally once a day (09 Apr 2021 11:54)  valsartan 160 mg oral capsule: 1 tab(s) orally once a day (09 Apr 2021 11:54)    MEDICATIONS  (STANDING):  atorvastatin Oral Tab/Cap - Peds 10 milliGRAM(s) Oral daily  diltiazem    milliGRAM(s) Oral daily  levothyroxine 25 MICROGram(s) Oral daily  predniSONE   Tablet 30 milliGRAM(s) Oral daily  valsartan 160 milliGRAM(s) Oral daily  vancomycin    Solution 125 milliGRAM(s) Oral every 6 hours    MEDICATIONS  (PRN):  acetaminophen   Tablet .. 650 milliGRAM(s) Oral every 4 hours PRN Mild Pain (1 - 3), Moderate Pain (4 - 6)      Allergies  latex (Other)  penicillins (Other)  ragweed pollen allergen extract (Other)      Review of Systems:   Constitutional:  No Fever, No Chills  ENT/Mouth:  No Hearing Changes,  No Difficulty Swallowing  Eyes:  No Eye Pain, No Vision Changes  Cardiovascular:  No Chest Pain, No Palpitations  Respiratory:  No Cough, No Dyspnea  Gastrointestinal:  As described in HPI  Musculoskeletal:  No Joint Swelling, No Back Pain  Skin:  No Skin Lesions, No Jaundice  Neuro:  No Syncope, No Dizziness  Heme/Lymph:  No Bruising, No Bleeding.          Physical Examination:  T(C): 36.1 (04-09-21 @ 06:56), Max: 36.9 (04-08-21 @ 23:15)  HR: 71 (04-09-21 @ 06:56) (65 - 71)  BP: 103/58 (04-09-21 @ 06:56) (103/58 - 134/64)  RR: 18 (04-09-21 @ 06:56) (18 - 18)  SpO2: 96% (04-09-21 @ 06:56) (96% - 97%)        Constitutional: No acute distress.  Eyes:. Conjunctivae are clear, Sclera is non-icteric.  Ears Nose and Throat: The external ears are normal appearing,  Oral mucosa is pink and moist.  Respiratory:  No signs of respiratory distress. Lung sounds are clear bilaterally.  Cardiovascular:  S1 S2, Regular rate and rhythm.  GI: Abdomen is soft, symmetric, and non-tender without distention.  Bowel sounds are present and normoactive in all four quadrants. No masses, hepatomegaly, or splenomegaly are noted.   Neuro: No Tremor, No involuntary movements  Skin: No rashes, No Jaundice.          Data: (reviewed by attending)                        7.3    12.45 )-----------( 254      ( 09 Apr 2021 11:17 )             22.0     Hgb Trend:  7.3  04-09-21 @ 11:17  7.9  04-09-21 @ 00:50      04-09    134<L>  |  99  |  42<H>  ----------------------------<  76  3.8   |  24  |  2.3<H>    Ca    8.7      09 Apr 2021 11:17    TPro  4.9<L>  /  Alb  2.8<L>  /  TBili  0.9  /  DBili  x   /  AST  24  /  ALT  12  /  AlkPhos  93  04-09    Liver panel trend:  TBili 0.9   /   AST 24   /   ALT 12   /   AlkP 93   /   Tptn 4.9   /   Alb 2.8    /   DBili --      04-09  TBili 1.0   /   AST 26   /   ALT 13   /   AlkP 100   /   Tptn 5.7   /   Alb 2.9    /   DBili --      04-09      PT/INR - ( 09 Apr 2021 00:50 )   PT: >40.00 sec;   INR: 3.52 ratio         PTT - ( 09 Apr 2021 00:50 )  PTT:35.5 sec        Radiology:(reviewed by attending)

## 2021-04-09 NOTE — CONSULT NOTE ADULT - ATTENDING COMMENTS
pt with recently diagnosed C.dif, on vanco, had multiple episodes of bloody stools. may be from C.dif vs. hemorrhoidal irritation from multiple BMs. currently no longer having rectal bleed. pt does not want colonoscopy at this time. if pt has no further bleeding, can resume a/c and monitor. if pt conts to bleed may need to reconsider colonoscopy.

## 2021-04-09 NOTE — ED ADULT NURSE REASSESSMENT NOTE - NS ED NURSE REASSESS COMMENT FT1
pt received from previous shift  awake alert and interactive  pt linens changed and placed on primafit   denies any complaints at this time  family at bedside  will continue care

## 2021-04-09 NOTE — H&P ADULT - NSHPPHYSICALEXAM_GEN_ALL_CORE
GEN: NAD, Resting comfortably in bed  PULM: Clear to auscultation bilaterally, No wheezes  CVS: Regular rate and rhythm, S1-S2, no murmurs  ABD: Soft, non-tender, non-distended, no guarding  EXT: No edema  NEURO: A&Ox3, no focal deficits GEN: NAD, Resting comfortably in bed  PULM: Clear to auscultation bilaterally, No wheezes  CVS: Regular rate and rhythm, S1-S2, no murmurs  ABD: Soft, non-tender, non-distended, no guarding  EXT: Bilateral 2+ pitting edema to ankles, gouty arthritis of bilateral hands  NEURO: A&Ox3, no focal deficits

## 2021-04-09 NOTE — ED PROVIDER NOTE - NS ED ROS FT
Review of Systems:  CONSTITUTIONAL: No fever, No diaphoresis   SKIN: No rash  HEMATOLOGIC: No abnormal bleeding or bruising  EYES: No eye pain, No blurred vision  ENT:  No sore throat, No neck pain, No rhinorrhea   RESPIRATORY: No shortness of breath, No cough  CARDIAC: No chest pain, No palpitations  GI: No abdominal pain, No nausea, No vomiting, +diarrhea, No constipation, +bright red blood per rectum. No flank pain  : No dysuria, frequency, hematuria.   MUSCULOSKELETAL: No joint paint, No swelling, No back pain  NEUROLOGIC: No numbness, No focal weakness, No headache, No dizziness  All other systems negative, unless specified in HPI

## 2021-04-09 NOTE — ED PROVIDER NOTE - ATTENDING CONTRIBUTION TO CARE
pt co diarrhea for 3 weeks, watery until today. cdiff+ on monday, given vanco since then.  on eliquis for afib. today having bloody bm. has baseline anemia per daughter, has had transfusions in the past. denies fever, ab pain, n, v, d.    pt in nad, ctab, rrr, ab soft, nt nd, nml bs. rectal+gross blood. +external hemorrhoids, flat, non tender. no obvious bleeding.    will get labs.

## 2021-04-09 NOTE — ED PROVIDER NOTE - PHYSICAL EXAMINATION
CONSTITUTIONAL: Well-developed; well-nourished; in no acute distress.   SKIN: warm, dry  HEAD: Normocephalic; atraumatic.  EYES: no conjunctival injection. EOMI.   ENT: No nasal discharge; airway clear.  NECK: Supple; non tender.  CARD: +irregularly irregular   RESP: No wheezes, rales or rhonchi.  ABD: soft ntnd. No CVA TTP.   EXT: Normal ROM.  No clubbing, cyanosis or edema.   LYMPH: No acute cervical adenopathy.  NEURO: Alert, oriented, grossly unremarkable. No FND.   PSYCH: Cooperative, appropriate. CONSTITUTIONAL: Well-developed; well-nourished; in no acute distress.   SKIN: warm, dry  HEAD: Normocephalic; atraumatic.  EYES: no conjunctival injection. EOMI.   ENT: No nasal discharge; airway clear.  NECK: Supple; non tender.  CARD: +irregularly irregular   RESP: No wheezes, rales or rhonchi.  ABD: soft ntnd. No CVA TTP.   RECTAL: nontender, +external hemorrhoids, +bright red blood, normal tone   EXT: Normal ROM.  No clubbing, cyanosis or edema.   LYMPH: No acute cervical adenopathy.  NEURO: Alert, oriented, grossly unremarkable. No FND.   PSYCH: Cooperative, appropriate.

## 2021-04-09 NOTE — H&P ADULT - NSHPOUTPATIENTPROVIDERS_GEN_ALL_CORE
PCP: Dr. Guido Barber  Cardiologist: Dr. Garcia PCP: Dr. Guido Barber  Vascular Sx: Dr. Kraft  Urologist: Dr. Carbajal  Cardiologist: Dr. Garcia

## 2021-04-09 NOTE — H&P ADULT - ASSESSMENT
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 difficile on D5 of Vancomycin.       # C. diff infx with assoc. [facility]  - maintain contact precautions  - f/u ID reccs, pt still had diarrhea this AM  - f/u C.diff PCR  - T/S active, tx for Hgb < 7.0   - Hgb 7.9 last night, 7.3 11AM, f/u 8pm CBC  - GI consulted, f/u reccs  - HOLD eliquis for now d/t GI bleed   - f/u CBC at 8PM ---> tx for Hgb < 7.0   - c/w Vancomycin 125 q6 for 10 days total (end on 4/14)    # AAA s/p EVAR with assoc. renal artery stent/coiling (2017)  - pt is hemodynamically stable  - c/w valsartan to prevent HTN    # HF, suspected with LE edema  - f/u ECHO  - pt follows with Dr. Taylor  - pt takes 40 mg PO Lasix at home with minimal improvement  - started on Lasix 40 IV daily for swelling, f/u BMP, stop if CODY on CKD    # CODY on CKD 4  - Cr 2.9/BUN40 --> Cr 2.3/BUN42, likely prerenal d/t vol. loss from diarrhea  - d/t hx of AAA  - pts daughter states renal fx has gradually worsened  - Cr 2.3 on admission, was 1.6 in 12/2019 as per [facility] labs  - Nephro consult if worsening CODY on CKD    #HTN  - restarted home diltiazem  - monitor BP, stable since admission    # Hypothyroidism  - c/w levothyroxine 25 mcg daily    # Hypokalemia  - K+ 3.3 yesterday, repleted  - f/u serial BMP    ---------------------------------------------------------------------  # DVT ppx: HOLD, resume eliquis pending GI reccs  # GI ppx: none  # Activity: IAT  # Diet: Regular  # Code status: FULL    81 y/o F with PMhx HTN, AAA s/p EVAR, 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 difficile on D5 of Vancomycin.       # C. diff infx with assoc. [facility]  - maintain contact precautions  - f/u ID reccs, pt still had diarrhea this AM  - f/u C.diff PCR  - T/S active, tx for Hgb < 7.0   - Hgb 7.9 last night, 7.3 11AM, f/u 8pm CBC  - GI consulted, f/u reccs  - HOLD eliquis for now d/t GI bleed   - f/u CBC at 8PM ---> tx for Hgb < 7.0   - c/w Vancomycin 125 q6 for 10 days total (end on 4/14)    # AAA s/p EVAR with assoc. renal artery stent/coiling (2017)  - pt is hemodynamically stable  - c/w valsartan to prevent HTN    # HF, suspected with LE edema  - f/u ECHO  - pt follows with Dr. Taylor  - pt takes 40 mg PO Lasix at home with minimal improvement  - started on Lasix 40 IV daily for swelling, f/u BMP, stop if CODY on CKD    # Afib, rate controlled  - HR 71 this AM   - hold eliquis d/t bleed  - not on meds for rate control  - start metoprolol if tachy to avoid overload    # CODY on CKD 4  - Cr 2.9/BUN40 --> Cr 2.3/BUN42, likely prerenal d/t vol. loss from diarrhea  - d/t hx of AAA  - pts daughter states renal fx has gradually worsened  - Cr 2.3 on admission, was 1.6 in 12/2019 as per [facility] labs  - Nephro consult if worsening CODY on CKD    #HTN  - restarted home diltiazem  - monitor BP, stable since admission    # Hypothyroidism  - c/w levothyroxine 25 mcg daily    # Hypokalemia  - K+ 3.3 yesterday, repleted  - f/u serial BMP    ---------------------------------------------------------------------  # DVT ppx: HOLD, resume eliquis pending GI reccs  # GI ppx: none  # Activity: IAT  # Diet: Regular  # Code status: FULL    83 y/o F with PMhx HTN, AAA s/p EVAR, 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 difficile on D5 of Vancomycin.       # C. diff infx with assoc. [facility]  - maintain contact precautions  - f/u ID reccs, pt still had diarrhea this AM  - f/u C.diff PCR  - T/S active, tx for Hgb < 7.0   - Hgb 7.9 last night, 7.3 11AM, f/u 8pm CBC  - GI consulted, f/u reccs  - HOLD eliquis for now d/t GI bleed   - f/u CBC at 8PM ---> tx for Hgb < 7.0   - c/w Vancomycin 125 q6 for 10 days total (end on 4/14)    # AAA s/p EVAR with assoc. renal artery stent/coiling (2017)  - pt is hemodynamically stable  - c/w valsartan to prevent HTN    # HF, suspected with LE edema  - f/u ECHO  - pt follows with Dr. Taylor  - pt takes 40 mg PO Lasix at home with minimal improvement  - started on Lasix 40 IV daily for swelling, f/u BMP, stop if CODY on CKD    # Afib, rate controlled  - HR 71 this AM   - hold eliquis d/t bleed  - not on meds for rate control  - start metoprolol if tachy to avoid overload    # CODY on CKD 4  - Cr 2.9/BUN40 --> Cr 2.3/BUN42, likely prerenal d/t vol. loss from diarrhea  - d/t hx of AAA  - pts daughter states renal fx has gradually worsened  - Cr 2.3 on admission, was 1.6 in 12/2019 as per [facility] labs  - Nephro consult if worsening CODY on CKD    #HTN  - restarted home diltiazem  - monitor BP, stable since admission    # Hypothyroidism  - c/w levothyroxine 25 mcg daily    # Hypokalemia  - K+ 3.3 yesterday, repleted  - f/u serial BMP    ---------------------------------------------------------------------  # DVT ppx: SCDs for now, resume eliquis pending GI reccs  # GI ppx: none  # Activity: IAT  # Diet: Clear liquid  # Code status: FULL    83 y/o F with PMhx HTN, AAA s/p EVAR, 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 difficile on D5 of Vancomycin.       # C. diff infx with assoc. [facility]  - maintain contact precautions  - f/u ID reccs, pt still had diarrhea this AM  - f/u C.diff PCR  - T/S active, tx for Hgb < 7.0   - Hgb 7.9 last night, 7.3 11AM, f/u 8pm CBC  - GI consulted, f/u reccs  - HOLD eliquis for now d/t GI bleed   - f/u CBC at 8PM ---> tx for Hgb < 7.0   - c/w Vancomycin 125 q6 for 10 days total (end on 4/14)    # AAA s/p EVAR with assoc. renal artery stent/coiling (2017)  - pt is hemodynamically stable  - c/w valsartan to prevent HTN    # Renal mass, 1.8 cm  - follow up outpt with Dr. Valencia  - benign as per patient's daughter    # HF, suspected with LE edema  - f/u ECHO  - pt follows with Dr. Taylor  - pt takes 40 mg PO Lasix at home with minimal improvement  - hold Lasix 40 for swelling d/t prerenal CODY on CKD, f/u BMP, stop if CODY on CKD    # Afib, rate controlled  - HR 71 this AM   - hold eliquis d/t bleed  - not on meds for rate control  - start metoprolol if tachy to avoid overload    # CODY on CKD 4  - Cr 2.9/BUN40 --> Cr 2.3/BUN42, likely prerenal d/t vol. loss from diarrhea  - d/t hx of AAA  - pts daughter states renal fx has gradually worsened  - Cr 2.3 on admission, was 1.6 in 12/2019 as per [facility] labs  - sending in urine lytes/FeNA  - Nephro consult if worsening CODY on CKD    #HTN  - restarted home diltiazem  - monitor BP, stable since admission    # Hypothyroidism  - c/w levothyroxine 25 mcg daily    # Hypokalemia  - K+ 3.3 yesterday, repleted  - f/u serial BMP    ---------------------------------------------------------------------  # DVT ppx: SCDs for now, resume eliquis pending GI reccs  # GI ppx: none  # Activity: IAT  # Diet: Clear liquid  # Code status: FULL    81 y/o F with PMhx HTN, AAA s/p EVAR, 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 difficile on D5 of Vancomycin.       # C. diff infx with assoc. [facility]  - maintain contact precautions  - f/u ID reccs, pt still had diarrhea this AM  - f/u C.diff PCR  - T/S active, tx for Hgb < 7.0   - Hgb 7.9 last night, 7.3 11AM, f/u 8pm CBC  - GI consulted, f/u reccs  - HOLD eliquis for now d/t GI bleed   - f/u CBC at 8PM ---> tx for Hgb < 7.0   - c/w Vancomycin 125 q6 for 10 days total (end on 4/14)    # AAA s/p EVAR with assoc. renal artery stent/coiling (2017)  - pt is hemodynamically stable  - c/w valsartan to prevent HTN    # Renal mass, 1.8 cm  - follow up outpt with Dr. Valencia  - benign as per patient's daughter    # HF, suspected with LE edema  - f/u ECHO  - pt follows with Dr. Taylor  - pt takes 40 mg PO Lasix at home with minimal improvement  - hold Lasix 40 for swelling d/t prerenal CODY on CKD, f/u BMP, stop if CODY on CKD    # Afib, rate controlled  - HR 71 this AM   - hold eliquis d/t bleed  - not on meds for rate control  - start metoprolol if tachy to avoid overload    # CODY on CKD 4  - Cr 2.9/BUN40 --> Cr 2.3/BUN42, likely prerenal d/t vol. loss from diarrhea  - d/t hx of AAA  - pts daughter states renal fx has gradually worsened  - Cr 2.3 on admission, was 1.6 in 12/2019 as per [facility] labs  - f/u urine urea/serum urea to assess fractional excretion (already gave Lasix IV 1 dose so cant do FeNa)  - Nephro consult if worsening CODY on CKD    #HTN  - restarted home diltiazem  - monitor BP, stable since admission    # Hypothyroidism  - c/w levothyroxine 25 mcg daily    # Hypokalemia  - K+ 3.3 yesterday, repleted  - f/u serial BMP    ---------------------------------------------------------------------  # DVT ppx: SCDs for now, resume eliquis pending GI reccs  # GI ppx: none  # Activity: IAT  # Diet: Clear liquid  # Code status: FULL

## 2021-04-09 NOTE — ED PROVIDER NOTE - CARE PLAN
Principal Discharge DX:	Clostridium difficile colitis  Secondary Diagnosis:	Hypokalemia  Secondary Diagnosis:	Bright red blood per rectum  Secondary Diagnosis:	CODY (acute kidney injury)

## 2021-04-10 LAB
ANION GAP SERPL CALC-SCNC: 14 MMOL/L — SIGNIFICANT CHANGE UP (ref 7–14)
BASOPHILS # BLD AUTO: 0.02 K/UL — SIGNIFICANT CHANGE UP (ref 0–0.2)
BASOPHILS NFR BLD AUTO: 0.1 % — SIGNIFICANT CHANGE UP (ref 0–1)
BUN SERPL-MCNC: 38 MG/DL — HIGH (ref 10–20)
CALCIUM SERPL-MCNC: 8.6 MG/DL — SIGNIFICANT CHANGE UP (ref 8.5–10.1)
CHLORIDE SERPL-SCNC: 99 MMOL/L — SIGNIFICANT CHANGE UP (ref 98–110)
CO2 SERPL-SCNC: 23 MMOL/L — SIGNIFICANT CHANGE UP (ref 17–32)
COVID-19 SPIKE DOMAIN AB INTERP: POSITIVE
COVID-19 SPIKE DOMAIN ANTIBODY RESULT: >250 U/ML — HIGH
CREAT SERPL-MCNC: 2 MG/DL — HIGH (ref 0.7–1.5)
EOSINOPHIL # BLD AUTO: 0 K/UL — SIGNIFICANT CHANGE UP (ref 0–0.7)
EOSINOPHIL NFR BLD AUTO: 0 % — SIGNIFICANT CHANGE UP (ref 0–8)
GLUCOSE SERPL-MCNC: 93 MG/DL — SIGNIFICANT CHANGE UP (ref 70–99)
HCT VFR BLD CALC: 23 % — LOW (ref 37–47)
HGB BLD-MCNC: 7.7 G/DL — LOW (ref 12–16)
IMM GRANULOCYTES NFR BLD AUTO: 0.9 % — HIGH (ref 0.1–0.3)
LYMPHOCYTES # BLD AUTO: 0.45 K/UL — LOW (ref 1.2–3.4)
LYMPHOCYTES # BLD AUTO: 3.1 % — LOW (ref 20.5–51.1)
MCHC RBC-ENTMCNC: 30.8 PG — SIGNIFICANT CHANGE UP (ref 27–31)
MCHC RBC-ENTMCNC: 33.5 G/DL — SIGNIFICANT CHANGE UP (ref 32–37)
MCV RBC AUTO: 92 FL — SIGNIFICANT CHANGE UP (ref 81–99)
MONOCYTES # BLD AUTO: 0.1 K/UL — SIGNIFICANT CHANGE UP (ref 0.1–0.6)
MONOCYTES NFR BLD AUTO: 0.7 % — LOW (ref 1.7–9.3)
NEUTROPHILS # BLD AUTO: 14.04 K/UL — HIGH (ref 1.4–6.5)
NEUTROPHILS NFR BLD AUTO: 95.2 % — HIGH (ref 42.2–75.2)
NRBC # BLD: 0 /100 WBCS — SIGNIFICANT CHANGE UP (ref 0–0)
PLATELET # BLD AUTO: 262 K/UL — SIGNIFICANT CHANGE UP (ref 130–400)
POTASSIUM SERPL-MCNC: 4.3 MMOL/L — SIGNIFICANT CHANGE UP (ref 3.5–5)
POTASSIUM SERPL-SCNC: 4.3 MMOL/L — SIGNIFICANT CHANGE UP (ref 3.5–5)
RBC # BLD: 2.5 M/UL — LOW (ref 4.2–5.4)
RBC # FLD: 16.7 % — HIGH (ref 11.5–14.5)
SARS-COV-2 IGG+IGM SERPL QL IA: >250 U/ML — HIGH
SARS-COV-2 IGG+IGM SERPL QL IA: POSITIVE
SODIUM SERPL-SCNC: 136 MMOL/L — SIGNIFICANT CHANGE UP (ref 135–146)
WBC # BLD: 14.74 K/UL — HIGH (ref 4.8–10.8)
WBC # FLD AUTO: 14.74 K/UL — HIGH (ref 4.8–10.8)

## 2021-04-10 PROCEDURE — 99233 SBSQ HOSP IP/OBS HIGH 50: CPT

## 2021-04-10 RX ADMIN — Medication 125 MILLIGRAM(S): at 17:44

## 2021-04-10 RX ADMIN — Medication 125 MILLIGRAM(S): at 23:34

## 2021-04-10 RX ADMIN — Medication 125 MILLIGRAM(S): at 05:48

## 2021-04-10 RX ADMIN — Medication 650 MILLIGRAM(S): at 19:25

## 2021-04-10 RX ADMIN — Medication 25 MICROGRAM(S): at 05:49

## 2021-04-10 RX ADMIN — VALSARTAN 160 MILLIGRAM(S): 80 TABLET ORAL at 05:50

## 2021-04-10 RX ADMIN — ATORVASTATIN CALCIUM 10 MILLIGRAM(S): 80 TABLET, FILM COATED ORAL at 21:12

## 2021-04-10 RX ADMIN — Medication 240 MILLIGRAM(S): at 05:48

## 2021-04-10 RX ADMIN — Medication 125 MILLIGRAM(S): at 02:46

## 2021-04-10 RX ADMIN — Medication 125 MILLIGRAM(S): at 11:43

## 2021-04-10 RX ADMIN — Medication 30 MILLIGRAM(S): at 05:55

## 2021-04-10 NOTE — ED ADULT NURSE REASSESSMENT NOTE - NS ED NURSE REASSESS COMMENT FT1
Assumed care from previous day shift nurse c/o + C DIFF, diarrhea , hypokalemia . Pt reports bilateral leg cramps , bilateral leg swelling and wants to resume home meds cream for leg swelling , called and informed admitting resident .Pts family wants to talk to the admitting resident .  Per admitting resident , he will be coming to evaluate he   pt at the bedside .Pt AO x 4 , no BM this time , no SOB , denies chest pain , denies nausea , denies abdominal pain , daughters at the bedside , awaiting for hospital bed

## 2021-04-10 NOTE — CONSULT NOTE ADULT - ASSESSMENT
ASSESSMENT  84 y/o F PMHx HLD, HTN, A-fib on Eliquis, hypothyroidism, CHF, AAA s/p EVAR (2017), left renal artery coil embolization, right renal artery stent and SMA stent (2017), with recent C. diff diagnosis started on PO Vancomycin by PCP on Monday, has been compliant, presents to ED with bright red blood per rectum    IMPRESSION  #Lower GI Bleed  #C diff colitis    #Atrial Fibrillation on eliquis  #AAA s/p EVAR  #Abx allergy: ragweed pollen allergen extract (Other)    RECOMMENDATIONS  - continue PO vancomycin 125 mg QID -- can plan for 14 day course total  - trend WBC and stool counts  - trend H/H    This is a preliminary incomplete pended note, all final recommendations to follow after interview and examination of the patient.    Please call or message on [employer] Teams if with any questions.  Spectra [phone]     ASSESSMENT  86 y/o F PMHx HLD, HTN, A-fib on Eliquis, hypothyroidism, CHF, AAA s/p EVAR (2017), left renal artery coil embolization, right renal artery stent and SMA stent (2017), with recent C. diff diagnosis started on PO Vancomycin by PCP on Monday, has been compliant, presents to ED with bright red blood per rectum    IMPRESSION  #Lower GI Bleed  #C diff colitis    #Atrial Fibrillation on eliquis  #AAA s/p EVAR  #Abx allergy: ragweed pollen allergen extract (Other)    RECOMMENDATIONS  - continue PO vancomycin 125 mg QID -- can plan for 14 day course total for now  - would try to clarify with PCP if this is a recurrence -- if this is, would consider fidaxomicin 200 mg BID for 10 days   - trend WBC and stool counts  - trend H/H    Please call or message on [employer] Teams if with any questions.  Spectra [phone]

## 2021-04-10 NOTE — PROGRESS NOTE ADULT - SUBJECTIVE AND OBJECTIVE BOX
S: Diarrhea better  No BRBPR since admission  No fever/N/V.  Eating ok  LE edema better  Right foot pain/redness better      All other pertinent ROS negative.      Vital Signs Last 24 Hrs  T(C): 35.7 (10 Apr 2021 07:43), Max: 35.7 (10 Apr 2021 07:43)  T(F): 96.2 (10 Apr 2021 07:43), Max: 96.2 (10 Apr 2021 07:43)  HR: 73 (10 Apr 2021 10:22) (71 - 82)  BP: 110/55 (10 Apr 2021 10:22) (95/54 - 113/66)  BP(mean): 84 (09 Apr 2021 15:00) (84 - 84)  RR: 18 (10 Apr 2021 07:43) (18 - 18)  SpO2: 96% (10 Apr 2021 07:43) (96% - 96%)  PHYSICAL EXAM:    Constitutional: NAD, awake and alert, well-developed  HEENT: PERR, EOMI, Normal Hearing, MMM  Neck: Soft and supple, No LAD, No JVD  Respiratory: Breath sounds are clear bilaterally, No wheezing, rales or rhonchi  Cardiovascular: S1 and S2, regular rate and rhythm, no Murmurs, gallops or rubs  Gastrointestinal: Bowel Sounds present, soft, nontender, nondistended, no guarding, no rebound  Extremities: Right foot - tophi deposits by the bunion. Right hand DIP tophi noted. LLE trace edema.       MEDICATIONS:  MEDICATIONS  (STANDING):  atorvastatin Oral Tab/Cap - Peds 10 milliGRAM(s) Oral daily  diltiazem    milliGRAM(s) Oral daily  levothyroxine 25 MICROGram(s) Oral daily  predniSONE   Tablet 30 milliGRAM(s) Oral daily  vancomycin    Solution 125 milliGRAM(s) Oral every 6 hours      LABS: All Labs Reviewed:                        7.7    14.74 )-----------( 262      ( 10 Apr 2021 05:35 )             23.0     04-10    136  |  99  |  38<H>  ----------------------------<  93  4.3   |  23  |  2.0<H>    Ca    8.6      10 Apr 2021 05:35    TPro  4.9<L>  /  Alb  2.8<L>  /  TBili  0.9  /  DBili  x   /  AST  24  /  ALT  12  /  AlkPhos  93  04-09    PT/INR - ( 09 Apr 2021 00:50 )   PT: >40.00 sec;   INR: 3.52 ratio         PTT - ( 09 Apr 2021 00:50 )  PTT:35.5 sec  CARDIAC MARKERS ( 09 Apr 2021 11:17 )  x     / 0.05 ng/mL / 140 U/L / x     / 3.0 ng/mL  CARDIAC MARKERS ( 09 Apr 2021 00:50 )  x     / 0.06 ng/mL / x     / x     / x          Blood Culture: 04-09 @ 00:50  Organism --  Gram Stain Blood -- Gram Stain --  Specimen Source .Blood Blood  Culture-Blood --        Radiology: reviewed

## 2021-04-10 NOTE — CONSULT NOTE ADULT - TIME BILLING
I have personally seen and examined this patient.    I have reviewed all pertinent clinical information and reviewed all relevant imaging and diagnostic studies personally.   I counseled the patient about diagnostic testing and treatment plan. All questions were answered.   I discussed recommendations with the primary team.

## 2021-04-10 NOTE — PROGRESS NOTE ADULT - ASSESSMENT
84 yo female with PMH of HTN, Atrial Fibrillation on Eliquis, hypothyroidism, CHF, AAA s/p EVAR (2017), left renal artery coil embolization, right renal artery stent and SMA stent (2017), with recent C. diff colitis started on PO Vancomycin by PCP on Monday was brought  to ED for bright red blood per rectum x1 day. Per daughter at bedside, patients toilet was filled with bright red blood on 4 separate instances of diarrhea 1 day PTA. Patient was having non-bloody diarrhea for 3 weeks prior to being started on Vancomycin. Patient also is c/o worsening edema and erythema on her right foot. She denies fever, chills, cough, chest pain or SOB. In the ED /57, HR 70, T 98F, Labs showed Hb 7.9      Acute lower GI bleeding:   Acute blood loss anemia:2/2  Acute C.Diff colitis:  Baseline Hb 12 per daughter a year ago.   Monitor Hb qd. Transfuse RBC to Hb >7   Continue Vancomycin PO, ID consult reviewed  h/o C diff colitis once prior in 2017. So this should be treated as a first episode? Not recurrence.   GI - Monitor Hgb. conservative management for now  clear liquid diet. Advance to GI soft.    CODY on CKD stage 3:   Cr was 2.2 on 4/1/21, was 1.7 in 2019.   Presented with 2.5. Now back to 2.0  Likely pre-renal, Send UA, send urine Cr, Na  Hold Valsartan d/t borderline BP/mild CODY  Encourage oral hydration, will hold IV fluid due to LE edema.     Acute on Chronic HFpEF (from history, no echo with diastolic function in chart):   had LE edema prior to presentation. Currently LE fine. trace edema in LLE. Check Duplex to r/o DVT  Echo - normal systolic, diastolic function could not be assessed on this one. RA,LA enlarged. Severe TR. - f/u with Dr. Palma outpatient.   Holding home dose Lasix PO 40 QD for now. resume when Cr stable.     Chronic Atrial Fibrillation:   Rate is controlled, continue Cardizem.   Hold Eliquis due to GI bleeding.  Admission INR 3.5. Repeat 4/11. Vit. K PRN.     Right foot pain and erythema d/t -  #Acute Gout Flare:  Started here on Prednisone 30mg PO daily.     Elevated Troponin: likely leak from CKD/CODY.   Full CODE. Discussed with her daughter at bedside.    Handoff: waiting for diarrhea to get better, ensure hgb stable.

## 2021-04-10 NOTE — CONSULT NOTE ADULT - SUBJECTIVE AND OBJECTIVE BOX
MARY ANNE MARQUES  85y, Female  Allergy: latex (Other)  penicillins (Other)  ragweed pollen allergen extract (Other)      CHIEF COMPLAINT: GI bleed (09 Apr 2021 16:17)      LOS  1d    HPI:  84 y/o F PMHx HLD, HTN, A-fib on Eliquis, hypothyroidism, CHF, AAA s/p EVAR (2017), left renal artery coil embolization, right renal artery stent and SMA stent (2017), with recent C. diff diagnosis started on PO Vancomycin by PCP on Monday, has been compliant, presents to ED with bright red blood per rectum x1 day.     Patient is accompanied by daughter at bedside who states that the patients toilet was filled with bright red blood on 4 separate instances of diarrhea yesterday. Patient was having non-bloody diarrhea for 3 weeks prior to being started on Vancomycin.    Pt denies fevers, CP, SOB, vomiting, dizziness, paresthesias. Patient has had 1 episode of slightly bloody diarrhea since admission.     In the ED, vital signs:   /57, HR 70, RR 18, T 98.4, SpO2 97% on room air  MARI (+), Hgb 7.9, Na 133, K+3.3 (09 Apr 2021 10:41)      INFECTIOUS DISEASE HISTORY:  History as above  WBC Count: 14.50 K/uL (04.09.21 @ 00:50) on admission  H/H has been stable, evaluated by GI  Recently tested positive for C diff Colitis  Started on PO vancomycin this past Monday.       PAST MEDICAL & SURGICAL HISTORY:  Heart failure    Stage 4 chronic kidney disease    Hypertension    Arthritis, gouty        FAMILY HISTORY  Family Hx reviewed and non-contributory     SOCIAL HISTORY  Social History:  Lives at home with daughters  Former smoker 20 pack years, quit 35 years ago  No alcohol use (09 Apr 2021 10:41)        ROS  General: Denies rigors, nightsweats  HEENT: Denies headache, rhinorrhea, sore throat, eye pain  CV: Denies CP, palpitations  PULM: Denies wheezing, hemoptysis  GI: Denies hematemesis, hematochezia, melena  : Denies discharge, hematuria  MSK: Denies arthralgias, myalgias  SKIN: Denies rash, lesions  NEURO: Denies paresthesias, weakness  PSYCH: Denies depression, anxiety    VITALS:  T(F): 96.1, Max: 96.1 (04-09-21 @ 15:00)  HR: 71  BP: 113/66  RR: 18Vital Signs Last 24 Hrs  T(C): 35.6 (09 Apr 2021 15:00), Max: 35.6 (09 Apr 2021 15:00)  T(F): 96.1 (09 Apr 2021 15:00), Max: 96.1 (09 Apr 2021 15:00)  HR: 71 (09 Apr 2021 15:00) (71 - 71)  BP: 113/66 (09 Apr 2021 15:00) (113/66 - 113/66)  BP(mean): 84 (09 Apr 2021 15:00) (84 - 84)  RR: 18 (09 Apr 2021 15:00) (18 - 18)  SpO2: 96% (09 Apr 2021 15:00) (96% - 96%)    PHYSICAL EXAM:  Gen: NAD, resting in bed  HEENT: Normocephalic, atraumatic  Neck: supple, no lymphadenopathy  CV: Regular rate & regular rhythm  Lungs: decreased BS at bases, no fremitus  Abdomen: Soft, BS present  Ext: Warm, well perfused  Neuro: non focal, awake  Skin: no rash, no erythema  Lines: no phlebitis    TESTS & MEASUREMENTS:                        7.8    15.16 )-----------( 257      ( 09 Apr 2021 22:02 )             23.2     04-09    x   |  x   |  40<H>  ----------------------------<  x   x    |  x   |  x     Ca    8.7      09 Apr 2021 11:17    TPro  4.9<L>  /  Alb  2.8<L>  /  TBili  0.9  /  DBili  x   /  AST  24  /  ALT  12  /  AlkPhos  93  04-09    eGFR if Non African American: 19 mL/min/1.73M2 (04-09-21 @ 11:17)  eGFR if : 22 mL/min/1.73M2 (04-09-21 @ 11:17)    LIVER FUNCTIONS - ( 09 Apr 2021 11:17 )  Alb: 2.8 g/dL / Pro: 4.9 g/dL / ALK PHOS: 93 U/L / ALT: 12 U/L / AST: 24 U/L / GGT: x               Culture - Blood (collected 04-09-21 @ 00:50)  Source: .Blood Blood  Preliminary Report (04-10-21 @ 07:01):    No growth to date.    Culture - Blood (collected 04-09-21 @ 00:50)  Source: .Blood Blood  Preliminary Report (04-10-21 @ 07:01):    No growth to date.        Lactate, Blood: 1.1 mmol/L (04-09-21 @ 00:50)      INFECTIOUS DISEASES TESTING      RADIOLOGY & ADDITIONAL TESTS:  I have personally reviewed the last Chest xray  CXR      CT      CARDIOLOGY TESTING  12 Lead ECG:   Ventricular Rate 70 BPM    Atrial Rate 0 BPM    QRS Duration 80 ms    Q-T Interval 344 ms    QTC Calculation(Bazett) 371 ms    R Axis 17 degrees    T Axis 243 degrees    Diagnosis Line Atrial fibrillation  Nonspecific ST and T wave abnormality  Abnormal ECG    Confirmed by SUSANNAH SPANGLER MD ([id number]) on 4/9/2021 8:11:28 AM (04-09-21 @ 01:35)      MEDICATIONS  atorvastatin Oral Tab/Cap - Peds 10 Oral daily  diltiazem    Oral daily  levothyroxine 25 Oral daily  predniSONE   Tablet 30 Oral daily  valsartan 160 Oral daily  vancomycin    Solution 125 Oral every 6 hours      Weight      ANTIBIOTICS:  vancomycin    Solution 125 milliGRAM(s) Oral every 6 hours      ALLERGIES:  latex (Other)  penicillins (Other)  ragweed pollen allergen extract (Other)       MARY ANNE MARQUES  85y, Female  Allergy: latex (Other)  penicillins (Other)  ragweed pollen allergen extract (Other)      CHIEF COMPLAINT: GI bleed (09 Apr 2021 16:17)      LOS  1d    HPI:  86 y/o F PMHx HLD, HTN, A-fib on Eliquis, hypothyroidism, CHF, AAA s/p EVAR (2017), left renal artery coil embolization, right renal artery stent and SMA stent (2017), with recent C. diff diagnosis started on PO Vancomycin by PCP on Monday, has been compliant, presents to ED with bright red blood per rectum x1 day.     Patient is accompanied by daughter at bedside who states that the patients toilet was filled with bright red blood on 4 separate instances of diarrhea yesterday. Patient was having non-bloody diarrhea for 3 weeks prior to being started on Vancomycin.    Pt denies fevers, CP, SOB, vomiting, dizziness, paresthesias. Patient has had 1 episode of slightly bloody diarrhea since admission.     In the ED, vital signs:   /57, HR 70, RR 18, T 98.4, SpO2 97% on room air  MARI (+), Hgb 7.9, Na 133, K+3.3 (09 Apr 2021 10:41)      INFECTIOUS DISEASE HISTORY:  History as above  WBC Count: 14.50 K/uL (04.09.21 @ 00:50) on admission  H/H has been stable, evaluated by GI  Recently tested positive for C diff Colitis  Started on PO vancomycin this past Monday.   She reports that she may have had C diff in the past, possibly 4 years ago, but not sure       PAST MEDICAL & SURGICAL HISTORY:  Heart failure    Stage 4 chronic kidney disease    Hypertension    Arthritis, gouty        FAMILY HISTORY  Family Hx reviewed and non-contributory     SOCIAL HISTORY  Social History:  Lives at home with daughters  Former smoker 20 pack years, quit 35 years ago  No alcohol use (09 Apr 2021 10:41)        ROS  General: Denies rigors, nightsweats  HEENT: Denies headache, rhinorrhea, sore throat, eye pain  CV: Denies CP, palpitations  PULM: Denies wheezing, hemoptysis  GI: Denies hematemesis, hematochezia, melena  : Denies discharge, hematuria  MSK: Denies arthralgias, myalgias  SKIN: Denies rash, lesions  NEURO: Denies paresthesias, weakness  PSYCH: Denies depression, anxiety    VITALS:  T(F): 96.1, Max: 96.1 (04-09-21 @ 15:00)  HR: 71  BP: 113/66  RR: 18Vital Signs Last 24 Hrs  T(C): 35.6 (09 Apr 2021 15:00), Max: 35.6 (09 Apr 2021 15:00)  T(F): 96.1 (09 Apr 2021 15:00), Max: 96.1 (09 Apr 2021 15:00)  HR: 71 (09 Apr 2021 15:00) (71 - 71)  BP: 113/66 (09 Apr 2021 15:00) (113/66 - 113/66)  BP(mean): 84 (09 Apr 2021 15:00) (84 - 84)  RR: 18 (09 Apr 2021 15:00) (18 - 18)  SpO2: 96% (09 Apr 2021 15:00) (96% - 96%)    PHYSICAL EXAM:  Gen: NAD, resting in bed  HEENT: Normocephalic, atraumatic  Neck: supple, no lymphadenopathy  CV: Regular rate & regular rhythm  Lungs: decreased BS at bases, no fremitus  Abdomen: Soft, BS present  Ext: Warm, well perfused  Neuro: non focal, awake  Skin: no rash, no erythema  Lines: no phlebitis    TESTS & MEASUREMENTS:                        7.8    15.16 )-----------( 257      ( 09 Apr 2021 22:02 )             23.2     04-09    x   |  x   |  40<H>  ----------------------------<  x   x    |  x   |  x     Ca    8.7      09 Apr 2021 11:17    TPro  4.9<L>  /  Alb  2.8<L>  /  TBili  0.9  /  DBili  x   /  AST  24  /  ALT  12  /  AlkPhos  93  04-09    eGFR if Non African American: 19 mL/min/1.73M2 (04-09-21 @ 11:17)  eGFR if : 22 mL/min/1.73M2 (04-09-21 @ 11:17)    LIVER FUNCTIONS - ( 09 Apr 2021 11:17 )  Alb: 2.8 g/dL / Pro: 4.9 g/dL / ALK PHOS: 93 U/L / ALT: 12 U/L / AST: 24 U/L / GGT: x               Culture - Blood (collected 04-09-21 @ 00:50)  Source: .Blood Blood  Preliminary Report (04-10-21 @ 07:01):    No growth to date.    Culture - Blood (collected 04-09-21 @ 00:50)  Source: .Blood Blood  Preliminary Report (04-10-21 @ 07:01):    No growth to date.        Lactate, Blood: 1.1 mmol/L (04-09-21 @ 00:50)      INFECTIOUS DISEASES TESTING      RADIOLOGY & ADDITIONAL TESTS:  I have personally reviewed the last Chest xray  CXR      CT      CARDIOLOGY TESTING  12 Lead ECG:   Ventricular Rate 70 BPM    Atrial Rate 0 BPM    QRS Duration 80 ms    Q-T Interval 344 ms    QTC Calculation(Bazett) 371 ms    R Axis 17 degrees    T Axis 243 degrees    Diagnosis Line Atrial fibrillation  Nonspecific ST and T wave abnormality  Abnormal ECG    Confirmed by SUSANNAH SPANGLER MD ([id number]) on 4/9/2021 8:11:28 AM (04-09-21 @ 01:35)      MEDICATIONS  atorvastatin Oral Tab/Cap - Peds 10 Oral daily  diltiazem    Oral daily  levothyroxine 25 Oral daily  predniSONE   Tablet 30 Oral daily  valsartan 160 Oral daily  vancomycin    Solution 125 Oral every 6 hours      Weight      ANTIBIOTICS:  vancomycin    Solution 125 milliGRAM(s) Oral every 6 hours      ALLERGIES:  latex (Other)  penicillins (Other)  ragweed pollen allergen extract (Other)

## 2021-04-10 NOTE — ED ADULT NURSE REASSESSMENT NOTE - NS ED NURSE REASSESS COMMENT FT1
pt awake alert and interactive  denies any complaints at this time  see MAR  fall precautions in place

## 2021-04-11 LAB
ANION GAP SERPL CALC-SCNC: 9 MMOL/L — SIGNIFICANT CHANGE UP (ref 7–14)
APTT BLD: 27.7 SEC — SIGNIFICANT CHANGE UP (ref 27–39.2)
BASOPHILS # BLD AUTO: 0.02 K/UL — SIGNIFICANT CHANGE UP (ref 0–0.2)
BASOPHILS NFR BLD AUTO: 0.1 % — SIGNIFICANT CHANGE UP (ref 0–1)
BLD GP AB SCN SERPL QL: SIGNIFICANT CHANGE UP
BUN SERPL-MCNC: 42 MG/DL — HIGH (ref 10–20)
CALCIUM SERPL-MCNC: 9 MG/DL — SIGNIFICANT CHANGE UP (ref 8.5–10.1)
CHLORIDE SERPL-SCNC: 99 MMOL/L — SIGNIFICANT CHANGE UP (ref 98–110)
CO2 SERPL-SCNC: 27 MMOL/L — SIGNIFICANT CHANGE UP (ref 17–32)
CREAT SERPL-MCNC: 2.1 MG/DL — HIGH (ref 0.7–1.5)
EOSINOPHIL # BLD AUTO: 0 K/UL — SIGNIFICANT CHANGE UP (ref 0–0.7)
EOSINOPHIL NFR BLD AUTO: 0 % — SIGNIFICANT CHANGE UP (ref 0–8)
GLUCOSE SERPL-MCNC: 137 MG/DL — HIGH (ref 70–99)
HCT VFR BLD CALC: 24.2 % — LOW (ref 37–47)
HGB BLD-MCNC: 7.8 G/DL — LOW (ref 12–16)
IMM GRANULOCYTES NFR BLD AUTO: 1.2 % — HIGH (ref 0.1–0.3)
INR BLD: 1.7 RATIO — HIGH (ref 0.65–1.3)
LYMPHOCYTES # BLD AUTO: 0.43 K/UL — LOW (ref 1.2–3.4)
LYMPHOCYTES # BLD AUTO: 3.1 % — LOW (ref 20.5–51.1)
MCHC RBC-ENTMCNC: 29.3 PG — SIGNIFICANT CHANGE UP (ref 27–31)
MCHC RBC-ENTMCNC: 32.2 G/DL — SIGNIFICANT CHANGE UP (ref 32–37)
MCV RBC AUTO: 91 FL — SIGNIFICANT CHANGE UP (ref 81–99)
MONOCYTES # BLD AUTO: 0.29 K/UL — SIGNIFICANT CHANGE UP (ref 0.1–0.6)
MONOCYTES NFR BLD AUTO: 2.1 % — SIGNIFICANT CHANGE UP (ref 1.7–9.3)
NEUTROPHILS # BLD AUTO: 12.8 K/UL — HIGH (ref 1.4–6.5)
NEUTROPHILS NFR BLD AUTO: 93.5 % — HIGH (ref 42.2–75.2)
NRBC # BLD: 0 /100 WBCS — SIGNIFICANT CHANGE UP (ref 0–0)
PLATELET # BLD AUTO: 297 K/UL — SIGNIFICANT CHANGE UP (ref 130–400)
POTASSIUM SERPL-MCNC: 3.8 MMOL/L — SIGNIFICANT CHANGE UP (ref 3.5–5)
POTASSIUM SERPL-SCNC: 3.8 MMOL/L — SIGNIFICANT CHANGE UP (ref 3.5–5)
PROTHROM AB SERPL-ACNC: 19.5 SEC — HIGH (ref 9.95–12.87)
RBC # BLD: 2.66 M/UL — LOW (ref 4.2–5.4)
RBC # FLD: 16.5 % — HIGH (ref 11.5–14.5)
SODIUM SERPL-SCNC: 135 MMOL/L — SIGNIFICANT CHANGE UP (ref 135–146)
WBC # BLD: 13.71 K/UL — HIGH (ref 4.8–10.8)
WBC # FLD AUTO: 13.71 K/UL — HIGH (ref 4.8–10.8)

## 2021-04-11 PROCEDURE — 99233 SBSQ HOSP IP/OBS HIGH 50: CPT

## 2021-04-11 PROCEDURE — 93970 EXTREMITY STUDY: CPT | Mod: 26

## 2021-04-11 RX ADMIN — Medication 125 MILLIGRAM(S): at 06:28

## 2021-04-11 RX ADMIN — Medication 240 MILLIGRAM(S): at 06:28

## 2021-04-11 RX ADMIN — Medication 125 MILLIGRAM(S): at 12:12

## 2021-04-11 RX ADMIN — Medication 25 MICROGRAM(S): at 06:28

## 2021-04-11 RX ADMIN — ATORVASTATIN CALCIUM 10 MILLIGRAM(S): 80 TABLET, FILM COATED ORAL at 21:55

## 2021-04-11 RX ADMIN — Medication 125 MILLIGRAM(S): at 18:38

## 2021-04-11 RX ADMIN — Medication 30 MILLIGRAM(S): at 06:29

## 2021-04-11 NOTE — PROGRESS NOTE ADULT - SUBJECTIVE AND OBJECTIVE BOX
S: No new events/complaints  diarrhea better  foot pain better      All other pertinent ROS negative.      04-10-21 @ 07:01  -  04-11-21 @ 07:00  --------------------------------------------------------  IN: 0 mL / OUT: 1 mL / NET: -1 mL      Vital Signs Last 24 Hrs  T(C): 36.3 (11 Apr 2021 06:27), Max: 36.3 (11 Apr 2021 06:27)  T(F): 97.3 (11 Apr 2021 06:27), Max: 97.3 (11 Apr 2021 06:27)  HR: 77 (11 Apr 2021 06:27) (67 - 77)  BP: 110/57 (11 Apr 2021 06:27) (93/53 - 123/60)  BP(mean): --  RR: 18 (11 Apr 2021 06:27) (18 - 18)  SpO2: 96% (11 Apr 2021 06:27) (95% - 99%)  PHYSICAL EXAM:    Constitutional: NAD, awake and alert, well-developed  HEENT: PERR, EOMI, Normal Hearing, MMM  Neck: Soft and supple, No LAD, No JVD  Respiratory: Breath sounds are clear bilaterally, No wheezing, rales or rhonchi  Cardiovascular: S1 and S2, regular rate and rhythm, no Murmurs, gallops or rubs  Gastrointestinal: Bowel Sounds present, soft, nontender, nondistended, no guarding, no rebound  Extremities: No peripheral edema, right foot gout better. trace lle edema.       MEDICATIONS:  MEDICATIONS  (STANDING):  atorvastatin Oral Tab/Cap - Peds 10 milliGRAM(s) Oral daily  diltiazem    milliGRAM(s) Oral daily  levothyroxine 25 MICROGram(s) Oral daily  predniSONE   Tablet 30 milliGRAM(s) Oral daily  vancomycin    Solution 125 milliGRAM(s) Oral every 6 hours      LABS: All Labs Reviewed:                        7.8    13.71 )-----------( 297      ( 11 Apr 2021 09:18 )             24.2     04-11    135  |  99  |  42<H>  ----------------------------<  137<H>  3.8   |  27  |  2.1<H>    Ca    9.0      11 Apr 2021 09:18      PT/INR - ( 11 Apr 2021 09:18 )   PT: 19.50 sec;   INR: 1.70 ratio         PTT - ( 11 Apr 2021 09:18 )  PTT:27.7 sec      Blood Culture: 04-09 @ 00:50  Organism --  Gram Stain Blood -- Gram Stain --  Specimen Source .Blood Blood  Culture-Blood --        Radiology: reviewed

## 2021-04-11 NOTE — PROGRESS NOTE ADULT - ASSESSMENT
84 yo female with PMH of HTN, Atrial Fibrillation on Eliquis, hypothyroidism, CHF, AAA s/p EVAR (2017), left renal artery coil embolization, right renal artery stent and SMA stent (2017), with recent C. diff colitis started on PO Vancomycin by PCP on Monday was brought  to ED for bright red blood per rectum x1 day. Per daughter at bedside, patients toilet was filled with bright red blood on 4 separate instances of diarrhea 1 day PTA. Patient was having non-bloody diarrhea for 3 weeks prior to being started on Vancomycin. Patient also is c/o worsening edema and erythema on her right foot. She denies fever, chills, cough, chest pain or SOB. In the ED /57, HR 70, T 98F, Labs showed Hb 7.9      Acute lower GI bleeding:   Acute blood loss anemia:2/2  Acute C.Diff colitis:  Baseline Hb 12 per daughter a year ago.   Monitor Hb qd. Transfuse RBC to Hb >7   Continue Vancomycin PO, ID consult reviewed  h/o C diff colitis once prior in 2017. So this should be treated as a first episode? Not recurrence.   GI - Monitor Hgb. conservative management for now  clear liquid diet. Advance to GI soft.    4/11: Hb stable at 7.8. Tolerating diet. WBC stable at 13. Diarrhea improving. complete po vanco course.     CODY on CKD stage 3:   Cr was 2.2 on 4/1/21, was 1.7 in 2019.   Presented with 2.5. Now back to 2.0  Likely pre-renal, Send UA, send urine Cr, Na  Hold Valsartan d/t borderline BP/mild CODY  Encourage oral hydration, will hold IV fluid due to LE edema.   4/11: 2.0. stable. c/w po hydration.     Acute on Chronic HFpEF (from history, no echo with diastolic function in chart):   had LE edema prior to presentation. Currently LE fine. trace edema in LLE. pending duplex to r/o DVT  Echo - normal systolic, diastolic function could not be assessed on this one. RA,LA enlarged. Severe TR. - f/u with Dr. Palma outpatient.   Holding home dose Lasix PO 40 QD for now. resume when Cr stable (maybe monday)    Chronic Atrial Fibrillation:   Rate is controlled, continue Cardizem.   Holding Eliquis due to GI bleeding > May resume monday & observe if bleeding recurs. If it does - GI eval.   Admission INR 3.5. Repeat 4/11 1.7.    Right foot pain and erythema d/t -  #Acute Gout Flare:  Started here on Prednisone 30mg PO daily (4/10)  Getting better    Elevated Troponin: likely leak from CKD/CODY.   Full CODE. Discussed with her daughter at bedside.    Handoff: waiting for diarrhea to get better, ensure hgb stable. Resume eliquis tomorrow and observe

## 2021-04-12 LAB
ANION GAP SERPL CALC-SCNC: 8 MMOL/L — SIGNIFICANT CHANGE UP (ref 7–14)
BASOPHILS # BLD AUTO: 0.02 K/UL — SIGNIFICANT CHANGE UP (ref 0–0.2)
BASOPHILS NFR BLD AUTO: 0.1 % — SIGNIFICANT CHANGE UP (ref 0–1)
BUN SERPL-MCNC: 50 MG/DL — HIGH (ref 10–20)
CALCIUM SERPL-MCNC: 8.7 MG/DL — SIGNIFICANT CHANGE UP (ref 8.5–10.1)
CHLORIDE SERPL-SCNC: 102 MMOL/L — SIGNIFICANT CHANGE UP (ref 98–110)
CO2 SERPL-SCNC: 27 MMOL/L — SIGNIFICANT CHANGE UP (ref 17–32)
CREAT SERPL-MCNC: 2.1 MG/DL — HIGH (ref 0.7–1.5)
EOSINOPHIL # BLD AUTO: 0 K/UL — SIGNIFICANT CHANGE UP (ref 0–0.7)
EOSINOPHIL NFR BLD AUTO: 0 % — SIGNIFICANT CHANGE UP (ref 0–8)
GLUCOSE SERPL-MCNC: 106 MG/DL — HIGH (ref 70–99)
HCT VFR BLD CALC: 21.5 % — LOW (ref 37–47)
HGB BLD-MCNC: 7.2 G/DL — LOW (ref 12–16)
IMM GRANULOCYTES NFR BLD AUTO: 0.9 % — HIGH (ref 0.1–0.3)
LYMPHOCYTES # BLD AUTO: 0.64 K/UL — LOW (ref 1.2–3.4)
LYMPHOCYTES # BLD AUTO: 4.1 % — LOW (ref 20.5–51.1)
MCHC RBC-ENTMCNC: 31 PG — SIGNIFICANT CHANGE UP (ref 27–31)
MCHC RBC-ENTMCNC: 33.5 G/DL — SIGNIFICANT CHANGE UP (ref 32–37)
MCV RBC AUTO: 92.7 FL — SIGNIFICANT CHANGE UP (ref 81–99)
MONOCYTES # BLD AUTO: 0.99 K/UL — HIGH (ref 0.1–0.6)
MONOCYTES NFR BLD AUTO: 6.3 % — SIGNIFICANT CHANGE UP (ref 1.7–9.3)
NEUTROPHILS # BLD AUTO: 13.89 K/UL — HIGH (ref 1.4–6.5)
NEUTROPHILS NFR BLD AUTO: 88.6 % — HIGH (ref 42.2–75.2)
NRBC # BLD: 0 /100 WBCS — SIGNIFICANT CHANGE UP (ref 0–0)
PLATELET # BLD AUTO: 263 K/UL — SIGNIFICANT CHANGE UP (ref 130–400)
POTASSIUM SERPL-MCNC: 3.9 MMOL/L — SIGNIFICANT CHANGE UP (ref 3.5–5)
POTASSIUM SERPL-SCNC: 3.9 MMOL/L — SIGNIFICANT CHANGE UP (ref 3.5–5)
RBC # BLD: 2.32 M/UL — LOW (ref 4.2–5.4)
RBC # FLD: 16.6 % — HIGH (ref 11.5–14.5)
SODIUM SERPL-SCNC: 137 MMOL/L — SIGNIFICANT CHANGE UP (ref 135–146)
WBC # BLD: 15.68 K/UL — HIGH (ref 4.8–10.8)
WBC # FLD AUTO: 15.68 K/UL — HIGH (ref 4.8–10.8)

## 2021-04-12 PROCEDURE — 99233 SBSQ HOSP IP/OBS HIGH 50: CPT

## 2021-04-12 RX ORDER — APIXABAN 2.5 MG/1
2.5 TABLET, FILM COATED ORAL
Refills: 0 | Status: DISCONTINUED | OUTPATIENT
Start: 2021-04-12 | End: 2021-04-18

## 2021-04-12 RX ADMIN — APIXABAN 2.5 MILLIGRAM(S): 2.5 TABLET, FILM COATED ORAL at 17:32

## 2021-04-12 RX ADMIN — Medication 125 MILLIGRAM(S): at 06:46

## 2021-04-12 RX ADMIN — ATORVASTATIN CALCIUM 10 MILLIGRAM(S): 80 TABLET, FILM COATED ORAL at 22:07

## 2021-04-12 RX ADMIN — Medication 125 MILLIGRAM(S): at 17:30

## 2021-04-12 RX ADMIN — Medication 240 MILLIGRAM(S): at 06:46

## 2021-04-12 RX ADMIN — Medication 125 MILLIGRAM(S): at 00:17

## 2021-04-12 RX ADMIN — Medication 25 MICROGRAM(S): at 06:46

## 2021-04-12 RX ADMIN — Medication 30 MILLIGRAM(S): at 06:46

## 2021-04-12 RX ADMIN — Medication 125 MILLIGRAM(S): at 11:56

## 2021-04-12 RX ADMIN — Medication 125 MILLIGRAM(S): at 22:07

## 2021-04-12 NOTE — PROGRESS NOTE ADULT - SUBJECTIVE AND OBJECTIVE BOX
S: foot pain better  solid formed stool x 1 today  eating fine  walked a little      All other pertinent ROS negative.      04-11-21 @ 07:01  -  04-12-21 @ 07:00  --------------------------------------------------------  IN: 0 mL / OUT: 500 mL / NET: -500 mL      Vital Signs Last 24 Hrs  T(C): 35.4 (12 Apr 2021 14:29), Max: 36.1 (12 Apr 2021 05:59)  T(F): 95.8 (12 Apr 2021 14:29), Max: 96.9 (12 Apr 2021 05:59)  HR: 79 (12 Apr 2021 14:29) (77 - 80)  BP: 96/69 (12 Apr 2021 14:29) (96/69 - 119/73)  BP(mean): --  RR: 18 (12 Apr 2021 14:29) (18 - 18)  SpO2: 97% (11 Apr 2021 21:38) (97% - 97%)  PHYSICAL EXAM:    Constitutional: NAD, awake and alert, well-developed  HEENT: PERR, EOMI, Normal Hearing, MMM  Neck: Soft and supple, No LAD, No JVD  Respiratory: Breath sounds are clear bilaterally, No wheezing, rales or rhonchi  Cardiovascular: S1 and S2, regular rate and rhythm, no Murmurs, gallops or rubs  Gastrointestinal: Bowel Sounds present, soft, nontender, nondistended, no guarding, no rebound  Extremities: No peripheral edema, right bunion swelling better      MEDICATIONS:  MEDICATIONS  (STANDING):  atorvastatin Oral Tab/Cap - Peds 10 milliGRAM(s) Oral daily  diltiazem    milliGRAM(s) Oral daily  levothyroxine 25 MICROGram(s) Oral daily  predniSONE   Tablet 30 milliGRAM(s) Oral daily  vancomycin    Solution 125 milliGRAM(s) Oral every 6 hours      LABS: All Labs Reviewed:                        7.2    15.68 )-----------( 263      ( 12 Apr 2021 06:50 )             21.5     04-12    137  |  102  |  50<H>  ----------------------------<  106<H>  3.9   |  27  |  2.1<H>    Ca    8.7      12 Apr 2021 06:50      PT/INR - ( 11 Apr 2021 09:18 )   PT: 19.50 sec;   INR: 1.70 ratio         PTT - ( 11 Apr 2021 09:18 )  PTT:27.7 sec      Blood Culture: 04-09 @ 00:50  Organism --  Gram Stain Blood -- Gram Stain --  Specimen Source .Blood Blood  Culture-Blood --        Radiology: reviewed

## 2021-04-12 NOTE — PROGRESS NOTE ADULT - SUBJECTIVE AND OBJECTIVE BOX
Gastroenterology progress note:     Patient is a 85y old  Female who presents with a chief complaint of GI bleed (11 Apr 2021 13:50)       Admitted on: 04-09-21    We are following the patient for blood per rectum      Interval History: no BM, no further bleeding episodes. no nausea no vomiting    Patient's medical problems are improving    Prior records reviewed (Y/N): Y  History obtained from someone other than patient (Y/N): N      PAST MEDICAL & SURGICAL HISTORY:  Heart failure  Stage 4 chronic kidney disease  Hypertension  Arthritis, gouty    MEDICATIONS  (STANDING):  atorvastatin Oral Tab/Cap - Peds 10 milliGRAM(s) Oral daily  diltiazem    milliGRAM(s) Oral daily  levothyroxine 25 MICROGram(s) Oral daily  predniSONE   Tablet 30 milliGRAM(s) Oral daily  vancomycin    Solution 125 milliGRAM(s) Oral every 6 hours    MEDICATIONS  (PRN):  acetaminophen   Tablet .. 650 milliGRAM(s) Oral every 4 hours PRN Mild Pain (1 - 3), Moderate Pain (4 - 6)      Allergies  latex (Other)  penicillins (Other)  ragweed pollen allergen extract (Other)      Review of Systems:   General: no fever  HEENT: no hemoptysis  Cardiovascular:  No Chest Pain, No Palpitations  Respiratory:  No Cough, No Dyspnea  Gastrointestinal:  As described in HPI  Hematology: no bruising or hematoma   Neurology: no new motor deficit  Skin: no new rash    Physical Examination:  T(C): 36.1 (04-12-21 @ 05:59), Max: 36.2 (04-11-21 @ 14:22)  HR: 77 (04-12-21 @ 05:59) (77 - 80)  BP: 119/73 (04-12-21 @ 05:59) (103/51 - 119/73)  RR: 18 (04-12-21 @ 05:59) (18 - 18)  SpO2: 97% (04-11-21 @ 21:38) (97% - 97%)    Constitutional: No acute distress.  Head: normocephalic  Neck: no palpable thyroid  Eyes: EOMI  Respiratory:  No signs of respiratory distress. Lung sounds are clear bilaterally.  Cardiovascular:  S1 S2, Regular rate and rhythm.  Abdominal: Abdomen is soft, symmetric, and non-tender without distention.    Skin: No rashes, No Jaundice.        Data: (reviewed by attending)                        7.2    15.68 )-----------( 263      ( 12 Apr 2021 06:50 )             21.5     Hgb trend:  7.2  04-12-21 @ 06:50  7.8  04-11-21 @ 09:18  7.7  04-10-21 @ 05:35  7.8  04-09-21 @ 22:02  7.3  04-09-21 @ 16:50        04-12    137  |  102  |  50<H>  ----------------------------<  106<H>  3.9   |  27  |  2.1<H>    Ca    8.7      12 Apr 2021 06:50      Liver panel trend:  TBili 0.9   /   AST 24   /   ALT 12   /   AlkP 93   /   Tptn 4.9   /   Alb 2.8    /   DBili --      04-09  TBili 1.0   /   AST 26   /   ALT 13   /   AlkP 100   /   Tptn 5.7   /   Alb 2.9    /   DBili --      04-09      PT/INR - ( 11 Apr 2021 09:18 )   PT: 19.50 sec;   INR: 1.70 ratio         PTT - ( 11 Apr 2021 09:18 )  PTT:27.7 sec       Radiology: (reviewed by attending)  none new

## 2021-04-12 NOTE — PHYSICAL THERAPY INITIAL EVALUATION ADULT - ADDITIONAL COMMENTS
pt reports having a walker and a cane at home, using the walker more often. pt's son reports that pt has a few steps to get into the house.

## 2021-04-12 NOTE — PROGRESS NOTE ADULT - ASSESSMENT
86 y/o F PMHx HLD, HTN, A-fib on Eliquis, hypothyroidism, CHF, AAA s/p EVAR (2017), left renal artery coil embolization, right renal artery stent and SMA stent (2017), with recent C. diff diagnosis started on PO Vancomycin by PCP on Monday, has been compliant, presents to ED with bright red blood per rectum x1 day.       #Non complicated C. Diff, recurrent    #fresh blood per rectum( resolved)    HD stable   No significant change in HGB   patient did not want to proceed with colonoscopy     REC:  trend CBC   diet as tolerated   2 large 18 gauge IV   c/w vancomycin,  84 y/o F PMHx HLD, HTN, A-fib on Eliquis, hypothyroidism, CHF, AAA s/p EVAR (2017), left renal artery coil embolization, right renal artery stent and SMA stent (2017), with recent C. diff diagnosis started on PO Vancomycin by PCP on Monday, has been compliant, presents to ED with bright red blood per rectum x1 day.       #Non complicated C. Diff, recurrent last one 2017    #fresh blood per rectum( resolved)    HD stable   No significant change in HGB   patient did not want to proceed with colonoscopy   WBC elevated, on prednisone    REC:  trend CBC   diet as tolerated   2 large 18 gauge IV   c/w vancomycin 125 mg every 6 hours. last recurrence 2017 so will treat as new infection, ID note appreciated  colonoscopy as OP  can resume necessary anticoagulation  consider giving IV iron before discharge    -for questions call [phone] during weekdays till 5pm and call GI service after 5pm and on weekends [phone] 84 y/o F PMHx HLD, HTN, A-fib on Eliquis, hypothyroidism, CHF, AAA s/p EVAR (2017), left renal artery coil embolization, right renal artery stent and SMA stent (2017), with recent C. diff diagnosis started on PO Vancomycin by PCP on Monday, has been compliant, presents to ED with bright red blood per rectum x1 day.       #Non complicated C. Diff, recurrent last one 2017    #fresh blood per rectum( resolved)    HD stable   No significant change in HGB   patient did not want to proceed with colonoscopy   WBC elevated, on prednisone    REC:  trend CBC   diet as tolerated   2 large 18 gauge IV   c/w vancomycin 125 mg every 6 hours. last recurrence 2017 so will treat as new infection, ID note appreciated  colonoscopy as OP  can resume necessary anticoagulation  consider giving IV iron before discharge    -call as needed, 0230 during weekdays till 5pm and call GI service after 5pm and on weekends [phone]  -Follow up with our [facility] located at [address] [place] [address]. Phone Number: [phone]

## 2021-04-12 NOTE — PHYSICAL THERAPY INITIAL EVALUATION ADULT - GENERAL OBSERVATIONS, REHAB EVAL
pt was encountered in semifowler position in NAD with son present. Pt was agreeable to participate in PT IE. pt c/o of 4/10 pain in her right foot and unable to move her toes, reporting that it is from gout. Bed mobility CG. Transfer Min A with RW. pt ambulated 25ft with RW and Min A. pt was left in a reclining chair with call bell nearby and son present. RN (Lidia) made aware.

## 2021-04-12 NOTE — PROGRESS NOTE ADULT - ASSESSMENT
86 yo female with PMH of HTN, Atrial Fibrillation on Eliquis, hypothyroidism, CHF, AAA s/p EVAR (2017), left renal artery coil embolization, right renal artery stent and SMA stent (2017), with recent C. diff colitis started on PO Vancomycin by PCP on Monday was brought  to ED for bright red blood per rectum x1 day. Per daughter at bedside, patients toilet was filled with bright red blood on 4 separate instances of diarrhea 1 day PTA. Patient was having non-bloody diarrhea for 3 weeks prior to being started on Vancomycin. Patient also is c/o worsening edema and erythema on her right foot. She denies fever, chills, cough, chest pain or SOB. In the ED /57, HR 70, T 98F, Labs showed Hb 7.9      Acute lower GI bleeding:   Acute blood loss anemia:2/2  Acute C.Diff colitis:  Baseline Hb 12 per daughter a year ago.   Monitor Hb qd. Transfuse RBC to Hb >8  Continue Vancomycin PO, ID consult reviewed  h/o C diff colitis once prior in 2017. So this should be treated as a first episode? Not recurrence.   GI - Monitor Hgb. conservative management for now  tolerating GI soft diet    4/11: Hb stable at 7.8. Tolerating diet. WBC stable at 13. Diarrhea improving. complete po vanco course.   4/12: Hb drifted down to 7.2. Probably settling down from old brbpr. No diarrhea today. No brbpr since admission.   Transfuse 1 u today for goal of 8 (suspect her to be having CAD since she has renal PAD). Resume Home eliquis. Watch Hgb in hospital. Discharge home 4/14.     CODY on CKD stage 3:   Cr was 2.2 on 4/1/21, was 1.7 in 2019.   Presented with 2.5. Now back to 2.0  Likely pre-renal, Send UA, send urine Cr, Na  Hold Valsartan d/t borderline BP/mild CODY  Encourage oral hydration, will hold IV fluid due to LE edema.   4/11: 2.0. stable. c/w po hydration.   4/12: stable 2.1.     Acute on Chronic HFpEF (from history, no echo with diastolic function in chart):   had LE edema prior to presentation. Currently LE fine. trace edema in LLE. pending duplex to r/o DVT  Echo - normal systolic, diastolic function could not be assessed on this one. RA,LA enlarged. Severe TR. - f/u with Dr. Palma outpatient.   Held home dose Lasix PO 40 QD for now. resume when Cr stable (maybe tuesday)    Chronic Atrial Fibrillation:   Rate is controlled, continue Cardizem.   Held Eliquis initially due to GI bleeding >resumed monday & observe if bleeding recurs. If it does - GI eval.   Admission INR 3.5. Repeat 4/11 1.7.    Right foot pain and erythema d/t -  #Acute Gout Flare:  Started here on Prednisone 30mg PO daily (4/10). D/c after 5 doses.  Getting better    Elevated Troponin: likely leak from CKD/CODY.   Full CODE. Discussed with her daughter at bedside.    Handoff: diarrhea better,  Resumed eliquis. ensure hgb stable. D/c home w/ PT 4/14     84 yo female with PMH of HTN, Atrial Fibrillation on Eliquis, hypothyroidism, CHF, AAA s/p EVAR (2017), left renal artery coil embolization, right renal artery stent and SMA stent (2017), with recent C. diff colitis started on PO Vancomycin by PCP on Monday was brought  to ED for bright red blood per rectum x1 day. Per daughter at bedside, patients toilet was filled with bright red blood on 4 separate instances of diarrhea 1 day PTA. Patient was having non-bloody diarrhea for 3 weeks prior to being started on Vancomycin. Patient also is c/o worsening edema and erythema on her right foot. She denies fever, chills, cough, chest pain or SOB. In the ED /57, HR 70, T 98F, Labs showed Hb 7.9      Acute lower GI bleeding:   Acute blood loss anemia:2/2  Acute C.Diff colitis:  Baseline Hb 12 per daughter a year ago.   Monitor Hb qd. Transfuse RBC to Hb >8  Continue Vancomycin PO, ID consult reviewed  h/o C diff colitis once prior in 2017. So this should be treated as a first episode? Not recurrence.   GI - Monitor Hgb. conservative management for now  tolerating GI soft diet    4/11: Hb stable at 7.8. Tolerating diet. WBC stable at 13. Diarrhea improving. complete po vanco course.   4/12: Hb drifted down to 7.2. Probably settling down from old brbpr. No diarrhea today. No brbpr since admission.   Transfuse 1 u today for goal of 8 (suspect her to be having CAD since she has renal PAD). Resume  eliquis. Watch Hgb in hospital. Discharge home 4/14.     CODY on CKD stage 3:   Cr was 2.2 on 4/1/21, was 1.7 in 2019.   Presented with 2.5. Now back to 2.0  Likely pre-renal, Send UA, send urine Cr, Na  Hold Valsartan d/t borderline BP/mild CODY  Encourage oral hydration, will hold IV fluid due to LE edema.   4/11: 2.0. stable. c/w po hydration.   4/12: stable 2.1.     Acute on Chronic HFpEF (from history, no echo with diastolic function in chart):   had LE edema prior to presentation. Currently LE fine. trace edema in LLE. pending duplex to r/o DVT  Echo - normal systolic, diastolic function could not be assessed on this one. RA,LA enlarged. Severe TR. - f/u with Dr. Palma outpatient.   Held home dose Lasix PO 40 QD for now. resume when Cr stable (maybe tuesday)    Chronic Atrial Fibrillation:   Rate is controlled, continue Cardizem.   Held Eliquis initially due to GI bleeding >resumed monday & observe if bleeding recurs. If it does - GI eval.   Admission INR 3.5. Repeat 4/11 1.7.  NOTE: BEfore admission patient was using eliquis 5 BID, but she fits criteria for decreased dose. WILL BE DISCHARGED ON 2.5 PO BID NOW.     Right foot pain and erythema d/t -  #Acute Gout Flare:  Started here on Prednisone 30mg PO daily (4/10). D/c after 5 doses.  Getting better    Elevated Troponin: likely leak from CKD/CODY.   Full CODE. Discussed with her daughter at bedside.    Handoff: diarrhea better,  Resumed eliquis. ensure hgb stable. D/c home w/ PT 4/14

## 2021-04-13 LAB
ANION GAP SERPL CALC-SCNC: 11 MMOL/L — SIGNIFICANT CHANGE UP (ref 7–14)
BASOPHILS # BLD AUTO: 0.02 K/UL — SIGNIFICANT CHANGE UP (ref 0–0.2)
BASOPHILS NFR BLD AUTO: 0.2 % — SIGNIFICANT CHANGE UP (ref 0–1)
BUN SERPL-MCNC: 49 MG/DL — HIGH (ref 10–20)
CALCIUM SERPL-MCNC: 8.4 MG/DL — LOW (ref 8.5–10.1)
CHLORIDE SERPL-SCNC: 102 MMOL/L — SIGNIFICANT CHANGE UP (ref 98–110)
CO2 SERPL-SCNC: 25 MMOL/L — SIGNIFICANT CHANGE UP (ref 17–32)
CREAT SERPL-MCNC: 2.1 MG/DL — HIGH (ref 0.7–1.5)
EOSINOPHIL # BLD AUTO: 0 K/UL — SIGNIFICANT CHANGE UP (ref 0–0.7)
EOSINOPHIL NFR BLD AUTO: 0 % — SIGNIFICANT CHANGE UP (ref 0–8)
GLUCOSE SERPL-MCNC: 110 MG/DL — HIGH (ref 70–99)
HCT VFR BLD CALC: 27.1 % — LOW (ref 37–47)
HGB BLD-MCNC: 8.7 G/DL — LOW (ref 12–16)
IMM GRANULOCYTES NFR BLD AUTO: 1.6 % — HIGH (ref 0.1–0.3)
LYMPHOCYTES # BLD AUTO: 0.77 K/UL — LOW (ref 1.2–3.4)
LYMPHOCYTES # BLD AUTO: 5.8 % — LOW (ref 20.5–51.1)
MCHC RBC-ENTMCNC: 29.1 PG — SIGNIFICANT CHANGE UP (ref 27–31)
MCHC RBC-ENTMCNC: 32.1 G/DL — SIGNIFICANT CHANGE UP (ref 32–37)
MCV RBC AUTO: 90.6 FL — SIGNIFICANT CHANGE UP (ref 81–99)
MONOCYTES # BLD AUTO: 0.84 K/UL — HIGH (ref 0.1–0.6)
MONOCYTES NFR BLD AUTO: 6.4 % — SIGNIFICANT CHANGE UP (ref 1.7–9.3)
NEUTROPHILS # BLD AUTO: 11.36 K/UL — HIGH (ref 1.4–6.5)
NEUTROPHILS NFR BLD AUTO: 86 % — HIGH (ref 42.2–75.2)
NRBC # BLD: 0 /100 WBCS — SIGNIFICANT CHANGE UP (ref 0–0)
PLATELET # BLD AUTO: 249 K/UL — SIGNIFICANT CHANGE UP (ref 130–400)
POTASSIUM SERPL-MCNC: 4.4 MMOL/L — SIGNIFICANT CHANGE UP (ref 3.5–5)
POTASSIUM SERPL-SCNC: 4.4 MMOL/L — SIGNIFICANT CHANGE UP (ref 3.5–5)
RBC # BLD: 2.99 M/UL — LOW (ref 4.2–5.4)
RBC # FLD: 15.9 % — HIGH (ref 11.5–14.5)
SODIUM SERPL-SCNC: 138 MMOL/L — SIGNIFICANT CHANGE UP (ref 135–146)
WBC # BLD: 13.2 K/UL — HIGH (ref 4.8–10.8)
WBC # FLD AUTO: 13.2 K/UL — HIGH (ref 4.8–10.8)

## 2021-04-13 PROCEDURE — 99233 SBSQ HOSP IP/OBS HIGH 50: CPT

## 2021-04-13 RX ORDER — FUROSEMIDE 40 MG
40 TABLET ORAL DAILY
Refills: 0 | Status: DISCONTINUED | OUTPATIENT
Start: 2021-04-13 | End: 2021-04-18

## 2021-04-13 RX ADMIN — APIXABAN 2.5 MILLIGRAM(S): 2.5 TABLET, FILM COATED ORAL at 05:53

## 2021-04-13 RX ADMIN — Medication 240 MILLIGRAM(S): at 05:52

## 2021-04-13 RX ADMIN — APIXABAN 2.5 MILLIGRAM(S): 2.5 TABLET, FILM COATED ORAL at 18:26

## 2021-04-13 RX ADMIN — Medication 40 MILLIGRAM(S): at 18:27

## 2021-04-13 RX ADMIN — Medication 125 MILLIGRAM(S): at 22:18

## 2021-04-13 RX ADMIN — Medication 30 MILLIGRAM(S): at 05:53

## 2021-04-13 RX ADMIN — Medication 650 MILLIGRAM(S): at 00:35

## 2021-04-13 RX ADMIN — Medication 650 MILLIGRAM(S): at 00:37

## 2021-04-13 RX ADMIN — Medication 125 MILLIGRAM(S): at 18:26

## 2021-04-13 RX ADMIN — Medication 125 MILLIGRAM(S): at 05:52

## 2021-04-13 RX ADMIN — Medication 125 MILLIGRAM(S): at 13:13

## 2021-04-13 RX ADMIN — ATORVASTATIN CALCIUM 10 MILLIGRAM(S): 80 TABLET, FILM COATED ORAL at 22:17

## 2021-04-13 RX ADMIN — Medication 25 MICROGRAM(S): at 05:52

## 2021-04-13 RX ADMIN — Medication 1 APPLICATION(S): at 05:53

## 2021-04-13 NOTE — PROGRESS NOTE ADULT - SUBJECTIVE AND OBJECTIVE BOX
S: No new events/complaints  Diarrhea better  eating ok  walking minimally      All other pertinent ROS negative.      04-12-21 @ 07:01  -  04-13-21 @ 07:00  --------------------------------------------------------  IN: 0 mL / OUT: 2 mL / NET: -2 mL      Vital Signs Last 24 Hrs  T(C): 35.7 (13 Apr 2021 05:12), Max: 36.1 (12 Apr 2021 22:59)  T(F): 96.2 (13 Apr 2021 05:12), Max: 96.9 (12 Apr 2021 22:59)  HR: 84 (13 Apr 2021 05:12) (83 - 84)  BP: 120/71 (13 Apr 2021 05:12) (120/71 - 133/60)  BP(mean): --  RR: 18 (13 Apr 2021 05:12) (18 - 18)  SpO2: 96% (12 Apr 2021 22:59) (96% - 96%)  PHYSICAL EXAM:    Constitutional: NAD, awake and alert, well-developed  HEENT: PERR, EOMI, Normal Hearing, MMM  Neck: Soft and supple, No LAD, No JVD  Respiratory: Breath sounds are clear bilaterally, No wheezing, rales or rhonchi  Cardiovascular: S1 and S2, regular rate and rhythm, no Murmurs, gallops or rubs  Gastrointestinal: Bowel Sounds present, soft, nontender, nondistended, no guarding, no rebound  Extremities: trace lle edema. bunion, tophi noted. rt foot swelling almost resolved.       MEDICATIONS:  MEDICATIONS  (STANDING):  apixaban 2.5 milliGRAM(s) Oral two times a day  atorvastatin Oral Tab/Cap - Peds 10 milliGRAM(s) Oral daily  diltiazem    milliGRAM(s) Oral daily  levothyroxine 25 MICROGram(s) Oral daily  predniSONE   Tablet 30 milliGRAM(s) Oral daily  vancomycin    Solution 125 milliGRAM(s) Oral every 6 hours      LABS: All Labs Reviewed:                        8.7    13.20 )-----------( 249      ( 13 Apr 2021 05:46 )             27.1     04-13    138  |  102  |  49<H>  ----------------------------<  110<H>  4.4   |  25  |  2.1<H>    Ca    8.4<L>      13 Apr 2021 05:46            Blood Culture: 04-09 @ 00:50  Organism --  Gram Stain Blood -- Gram Stain --  Specimen Source .Blood Blood  Culture-Blood --        Radiology: reviewed

## 2021-04-13 NOTE — PROGRESS NOTE ADULT - TIME BILLING
I have personally seen and examined this patient.    I have reviewed all pertinent clinical information and reviewed all relevant imaging and diagnostic studies personally.   I counseled the patient about diagnostic testing and treatment plan. All questions were answered.   I discussed recommendations with the primary team.
coordination of care

## 2021-04-13 NOTE — ADVANCED PRACTICE NURSE CONSULT - RECOMMEDATIONS
1. LLE ulceration- Cleanse wound bed w/ normal saline, gently pat dry. Apply Cavilon no-sting barrier film to wound edges and periwound to prevent maceration. Apply thin layer of hydrogel to wound bed to donate moisture to dry wound promoting moist wound healing and assist w/ autolytic debridement of devitalized tissue. Cover w/ dry gauze & secure w/ tape.  Apply hydrogel and change dressings daily & prn for strike-through drainage or soiling.  -Recommend patient follow-up w/ primary MD & Dr. Kraft upon d/c for continued wound management & treatment.   -Assess skin/wound qshift, report changes to primary provider.      Plan of Care: Primary RN Lidia made aware of above recs. Spoke w/ covering/primary TOMÁS Piper (at 1246) in regards to above. Signing off on patient, no further needs/recs from [facility] service at this time. Staff RN to perform routine skin/wound assessment and manage wound care. Questions or concerns or if wound worsens and reconsult needed, please contact [facility], Spectra #[phone].

## 2021-04-13 NOTE — PROGRESS NOTE ADULT - ASSESSMENT
ASSESSMENT  84 y/o F PMHx HLD, HTN, A-fib on Eliquis, hypothyroidism, CHF, AAA s/p EVAR (2017), left renal artery coil embolization, right renal artery stent and SMA stent (2017), with recent C. diff diagnosis started on PO Vancomycin by PCP on Monday, has been compliant, presents to ED with bright red blood per rectum    IMPRESSION  #Lower GI Bleed  #C diff colitis    #Atrial Fibrillation on eliquis  #AAA s/p EVAR  #Abx allergy: ragweed pollen allergen extract (Other)    RECOMMENDATIONS  - discussed with daughter -- last episode was in 2017; As this is >2 months since last episode, do not have to treat as recurrence  - continue PO vancomycin 125 mg QID - plan for 14 days total   - trend WBC and stool counts    Please call or message on Microsoft Teams if with any questions.  [facility]ra [phone]

## 2021-04-13 NOTE — PROGRESS NOTE ADULT - SUBJECTIVE AND OBJECTIVE BOX
MARY ANNE MARQUES  85y, Female  Allergy: latex (Other)  penicillins (Other)  ragweed pollen allergen extract (Other)      LOS  4d    CHIEF COMPLAINT: GI bleed (12 Apr 2021 15:05)      INTERVAL EVENTS/HPI  - No acute events overnight  - T(F): , Max: 96.9 (04-12-21 @ 22:59)  - Denies any worsening symptoms  - Tolerating medication  - WBC Count: 13.20 (04-13-21 @ 05:46)  WBC Count: 15.68 (04-12-21 @ 06:50)     - Creatinine, Serum: 2.1 (04-13-21 @ 05:46)  Creatinine, Serum: 2.1 (04-12-21 @ 06:50)       ROS  General: Denies rigors, nightsweats  HEENT: Denies headache, rhinorrhea, sore throat, eye pain  CV: Denies CP, palpitations  PULM: Denies wheezing, hemoptysis  GI: Denies hematemesis, hematochezia, melena  : Denies discharge, hematuria  MSK: Denies arthralgias, myalgias  SKIN: Denies rash, lesions  NEURO: Denies paresthesias, weakness  PSYCH: Denies depression, anxiety    VITALS:  T(F): 96.2, Max: 96.9 (04-12-21 @ 22:59)  HR: 84  BP: 120/71  RR: 18Vital Signs Last 24 Hrs  T(C): 35.7 (13 Apr 2021 05:12), Max: 36.1 (12 Apr 2021 22:59)  T(F): 96.2 (13 Apr 2021 05:12), Max: 96.9 (12 Apr 2021 22:59)  HR: 84 (13 Apr 2021 05:12) (79 - 84)  BP: 120/71 (13 Apr 2021 05:12) (96/69 - 133/60)  BP(mean): --  RR: 18 (13 Apr 2021 05:12) (18 - 18)  SpO2: 96% (12 Apr 2021 22:59) (96% - 96%)    PHYSICAL EXAM:  Gen: NAD, resting in bed  HEENT: Normocephalic, atraumatic  Neck: supple, no lymphadenopathy  CV: Regular rate & regular rhythm  Lungs: decreased BS at bases, no fremitus  Abdomen: Soft, BS present  Ext: Warm, well perfused  Neuro: non focal, awake  Skin: no rash, no erythema  Lines: no phlebitis    FH: Non-contributory  Social Hx: Non-contributory    TESTS & MEASUREMENTS:                        8.7    13.20 )-----------( 249      ( 13 Apr 2021 05:46 )             27.1     04-13    138  |  102  |  49<H>  ----------------------------<  110<H>  4.4   |  25  |  2.1<H>    Ca    8.4<L>      13 Apr 2021 05:46      eGFR if Non African American: 21 mL/min/1.73M2 (04-13-21 @ 05:46)  eGFR if : 24 mL/min/1.73M2 (04-13-21 @ 05:46)          Culture - Blood (collected 04-09-21 @ 00:50)  Source: .Blood Blood  Preliminary Report (04-10-21 @ 07:01):    No growth to date.    Culture - Blood (collected 04-09-21 @ 00:50)  Source: .Blood Blood  Preliminary Report (04-10-21 @ 07:01):    No growth to date.        Lactate, Blood: 1.1 mmol/L (04-09-21 @ 00:50)      INFECTIOUS DISEASES TESTING  Rapid RVP Result: NotDetec (04-09-21 @ 00:20)      INFLAMMATORY MARKERS      RADIOLOGY & ADDITIONAL TESTS:  I have personally reviewed the last available Chest xray  CXR      CT      CARDIOLOGY TESTING  12 Lead ECG:   Ventricular Rate 70 BPM    Atrial Rate 0 BPM    QRS Duration 80 ms    Q-T Interval 344 ms    QTC Calculation(Bazett) 371 ms    R Axis 17 degrees    T Axis 243 degrees    Diagnosis Line Atrial fibrillation  Nonspecific ST and T wave abnormality  Abnormal ECG    Confirmed by SUSANNAH SPANGLER MD ([id number]) on 4/9/2021 8:11:28 AM (04-09-21 @ 01:35)      MEDICATIONS  apixaban 2.5 Oral two times a day  atorvastatin Oral Tab/Cap - Peds 10 Oral daily  diltiazem    Oral daily  levothyroxine 25 Oral daily  predniSONE   Tablet 30 Oral daily  vancomycin    Solution 125 Oral every 6 hours      WEIGHT    Creatinine, Serum: 2.1 mg/dL (04-13-21 @ 05:46)      ANTIBIOTICS:  vancomycin    Solution 125 milliGRAM(s) Oral every 6 hours      All available historical records have been reviewed

## 2021-04-13 NOTE — PROGRESS NOTE ADULT - ASSESSMENT
86 yo female with PMH of HTN, Atrial Fibrillation on Eliquis, hypothyroidism, CHF, AAA s/p EVAR (2017), left renal artery coil embolization, right renal artery stent and SMA stent (2017), with recent C. diff colitis started on PO Vancomycin by PCP on Monday was brought  to ED for bright red blood per rectum x1 day. Per daughter at bedside, patients toilet was filled with bright red blood on 4 separate instances of diarrhea 1 day PTA. Patient was having non-bloody diarrhea for 3 weeks prior to being started on Vancomycin. Patient also is c/o worsening edema and erythema on her right foot. She denies fever, chills, cough, chest pain or SOB. In the ED /57, HR 70, T 98F, Labs showed Hb 7.9      Acute lower GI bleeding:   Acute blood loss anemia:2/2  Acute C.Diff colitis:  Baseline Hb 12 per daughter a year ago.   Monitor Hb qd. Transfuse RBC to Hb >8  Continue Vancomycin PO, ID consult reviewed  h/o C diff colitis once prior in 2017. So this should be treated as a first episode? Not recurrence.   GI - Monitor Hgb. conservative management for now  tolerating GI soft diet    4/11: Hb stable at 7.8. Tolerating diet. WBC stable at 13. Diarrhea improving. complete po vanco course.   4/12: Hb drifted down to 7.2. Probably settling down from old brbpr. No diarrhea today. No brbpr since admission.   Transfuse 1 u today for goal of 8 (suspect her to be having CAD since she has renal PAD). Resumed  eliquis. Watch Hgb in hospital.    4/13: On Eliquis. Diarrhea improving. Monitor Hgb. If stable - D/c Home tomorrow with home PT. Complete PO Vanco for 14 days total     CODY on CKD stage 3:   Cr was 2.2 on 4/1/21, was 1.7 in 2019.   Presented with 2.5. Now back to 2.0  Likely pre-renal, Send UA, send urine Cr, Na  Hold Valsartan d/t borderline BP/mild CODY  Encourage oral hydration, will hold IV fluid due to LE edema.   4/11: 2.0. stable. c/w po hydration.   4/12: stable 2.1.   4/13: Cr stable 2.1    Acute on Chronic HFpEF (from history, no echo with diastolic function in chart):   had LE edema prior to presentation. Currently LE fine. trace edema in LLE. pending duplex to r/o DVT  Echo - normal systolic, diastolic function could not be assessed on this one. RA,LA enlarged. Severe TR. - f/u with Dr. Palma outpatient.   Held home dose Lasix PO 40 QDinitially. resuming on 4/13 now that  Cr is stable    Chronic Atrial Fibrillation:   Rate is controlled, continue Cardizem.   Held Eliquis initially due to GI bleeding >resumed monday & observe if bleeding recurs. If it does - GI eval.   Admission INR 3.5. Repeat 4/11 1.7.  NOTE: BEfore admission patient was using eliquis 5 BID, but she fits criteria for decreased dose. WILL BE DISCHARGED ON 2.5 PO BID NOW.     Right foot pain and erythema d/t -  #Acute Gout Flare:  Started here on Prednisone 30mg PO daily (4/10). D/c after 5 doses (i.e. last dose on 4/15).  Getting better    Elevated Troponin: likely leak from CKD/CODY.   Full CODE. Discussed with her daughter at bedside.    Handoff: diarrhea better,  Resumed eliquis. ensure hgb stable. D/c home w/ PT 4/14

## 2021-04-13 NOTE — ADVANCED PRACTICE NURSE CONSULT - ASSESSMENT
86 y/o F PMHx HLD, HTN, A-fib on Eliquis, hypothyroidism, CHF, AAA s/p EVAR (2017), left renal artery coil embolization, right renal artery stent and SMA stent (2017), with recent C. diff diagnosis started on PO Vancomycin by PCP on Monday, has been compliant, presents to ED (4/9) with bright red blood per rectum x1 day.   In the ED, vital signs: /57, HR 70, RR 18, T 98.4, SpO2 97% on room air MARI (+), Hgb 7.9, Na 133, K+3.3    Patient currently admitted to medicine, on 4A, being managed for Acute lower GI bleeding; Acute blood loss anemia:2/2; Acute C.Diff colitis; CODY on CKD stage 3; Acute on Chronic HFpEF (from history, no echo with diastolic function in chart); Chronic Atrial Fibrillation; Right foot pain and erythema d/t -Acute Gout Flare; Elevated Troponin: likely leak from CKD/CODY.     Received patient on 4A, sitting up in chair, awake, A&Ox3, daughter at bedside, both made aware of purpose of WOCN visit, agreeable to consult.  Daughter reports pt's wound present ~ 3 weeks, unsure of how wound developed, prescribed triamcinolone acetone by primary MD & referred to Vascular MD Abena for further management.     LLE wound open to air.   Type of wound: Full thickness ulceration-etiology unknown- ? vascular ulceration  Location: medial LLE, superior to ankle area  Wound measurements: multiple areas in close proximity to each other & measured all together at 7cm x 6cm x 0.2cm  Tunneling/Undermining: none  Wound bed: dry yellow devitalized tissue   Wound edges: attached, irregular   Periwound: scattered areas of blanchable erythema       Wound exudate: none present & unable to express any drainage upon palpation  Wound odor: none  Induration, warmth:  none  Wound pain: denies

## 2021-04-14 ENCOUNTER — TRANSCRIPTION ENCOUNTER (OUTPATIENT)
Age: 86
End: 2021-04-14

## 2021-04-14 LAB
ANION GAP SERPL CALC-SCNC: 7 MMOL/L — SIGNIFICANT CHANGE UP (ref 7–14)
BLD GP AB SCN SERPL QL: SIGNIFICANT CHANGE UP
BUN SERPL-MCNC: 48 MG/DL — HIGH (ref 10–20)
CALCIUM SERPL-MCNC: 8.8 MG/DL — SIGNIFICANT CHANGE UP (ref 8.5–10.1)
CHLORIDE SERPL-SCNC: 102 MMOL/L — SIGNIFICANT CHANGE UP (ref 98–110)
CO2 SERPL-SCNC: 30 MMOL/L — SIGNIFICANT CHANGE UP (ref 17–32)
CREAT SERPL-MCNC: 1.9 MG/DL — HIGH (ref 0.7–1.5)
CULTURE RESULTS: SIGNIFICANT CHANGE UP
CULTURE RESULTS: SIGNIFICANT CHANGE UP
GLUCOSE SERPL-MCNC: 88 MG/DL — SIGNIFICANT CHANGE UP (ref 70–99)
HCT VFR BLD CALC: 27 % — LOW (ref 37–47)
HCT VFR BLD CALC: 27.2 % — LOW (ref 37–47)
HGB BLD-MCNC: 8.7 G/DL — LOW (ref 12–16)
HGB BLD-MCNC: 9 G/DL — LOW (ref 12–16)
MCHC RBC-ENTMCNC: 29.8 PG — SIGNIFICANT CHANGE UP (ref 27–31)
MCHC RBC-ENTMCNC: 30.7 PG — SIGNIFICANT CHANGE UP (ref 27–31)
MCHC RBC-ENTMCNC: 32 G/DL — SIGNIFICANT CHANGE UP (ref 32–37)
MCHC RBC-ENTMCNC: 33.3 G/DL — SIGNIFICANT CHANGE UP (ref 32–37)
MCV RBC AUTO: 92.2 FL — SIGNIFICANT CHANGE UP (ref 81–99)
MCV RBC AUTO: 93.2 FL — SIGNIFICANT CHANGE UP (ref 81–99)
NRBC # BLD: 0 /100 WBCS — SIGNIFICANT CHANGE UP (ref 0–0)
NRBC # BLD: 0 /100 WBCS — SIGNIFICANT CHANGE UP (ref 0–0)
PLATELET # BLD AUTO: 260 K/UL — SIGNIFICANT CHANGE UP (ref 130–400)
PLATELET # BLD AUTO: 265 K/UL — SIGNIFICANT CHANGE UP (ref 130–400)
POTASSIUM SERPL-MCNC: 4 MMOL/L — SIGNIFICANT CHANGE UP (ref 3.5–5)
POTASSIUM SERPL-SCNC: 4 MMOL/L — SIGNIFICANT CHANGE UP (ref 3.5–5)
RBC # BLD: 2.92 M/UL — LOW (ref 4.2–5.4)
RBC # BLD: 2.93 M/UL — LOW (ref 4.2–5.4)
RBC # FLD: 16.4 % — HIGH (ref 11.5–14.5)
RBC # FLD: 16.4 % — HIGH (ref 11.5–14.5)
SODIUM SERPL-SCNC: 139 MMOL/L — SIGNIFICANT CHANGE UP (ref 135–146)
SPECIMEN SOURCE: SIGNIFICANT CHANGE UP
SPECIMEN SOURCE: SIGNIFICANT CHANGE UP
WBC # BLD: 11.28 K/UL — HIGH (ref 4.8–10.8)
WBC # BLD: 11.8 K/UL — HIGH (ref 4.8–10.8)
WBC # FLD AUTO: 11.28 K/UL — HIGH (ref 4.8–10.8)
WBC # FLD AUTO: 11.8 K/UL — HIGH (ref 4.8–10.8)

## 2021-04-14 PROCEDURE — 99233 SBSQ HOSP IP/OBS HIGH 50: CPT

## 2021-04-14 RX ORDER — HYDROCORTISONE 1 %
1 OINTMENT (GRAM) TOPICAL
Refills: 0 | Status: DISCONTINUED | OUTPATIENT
Start: 2021-04-14 | End: 2021-04-16

## 2021-04-14 RX ORDER — SODIUM CHLORIDE 9 MG/ML
1000 INJECTION INTRAMUSCULAR; INTRAVENOUS; SUBCUTANEOUS
Refills: 0 | Status: DISCONTINUED | OUTPATIENT
Start: 2021-04-14 | End: 2021-04-18

## 2021-04-14 RX ADMIN — Medication 40 MILLIGRAM(S): at 06:51

## 2021-04-14 RX ADMIN — Medication 125 MILLIGRAM(S): at 12:43

## 2021-04-14 RX ADMIN — Medication 125 MILLIGRAM(S): at 17:44

## 2021-04-14 RX ADMIN — Medication 125 MILLIGRAM(S): at 06:50

## 2021-04-14 RX ADMIN — APIXABAN 2.5 MILLIGRAM(S): 2.5 TABLET, FILM COATED ORAL at 06:50

## 2021-04-14 RX ADMIN — SODIUM CHLORIDE 60 MILLILITER(S): 9 INJECTION INTRAMUSCULAR; INTRAVENOUS; SUBCUTANEOUS at 21:02

## 2021-04-14 RX ADMIN — Medication 125 MILLIGRAM(S): at 23:04

## 2021-04-14 RX ADMIN — Medication 30 MILLIGRAM(S): at 06:51

## 2021-04-14 RX ADMIN — APIXABAN 2.5 MILLIGRAM(S): 2.5 TABLET, FILM COATED ORAL at 17:44

## 2021-04-14 RX ADMIN — ATORVASTATIN CALCIUM 10 MILLIGRAM(S): 80 TABLET, FILM COATED ORAL at 23:04

## 2021-04-14 RX ADMIN — Medication 240 MILLIGRAM(S): at 06:50

## 2021-04-14 RX ADMIN — Medication 25 MICROGRAM(S): at 06:50

## 2021-04-14 NOTE — PROGRESS NOTE ADULT - ASSESSMENT
C. diff Colitis  Patient presented with BRBPR  Continue contact precautions  ID: Continue Vancomycin X 14 Days  GI: Colonoscopy but patient refused  HOLD eliquis for now d/t GI bleed   F/U  CBC at 8PM ---> tx for Hgb < 7.0   Continue Vancomycin 125 q6 for 10 days total (end on 4/18)  F/U C.diff PCR  T/S active, tx for Hgb < 7.0     AAA   s/p EVAR with assoc. renal artery stent/coiling (2017)  Patient is hemodynamically stable  Continue Valsartan    Renal mass  1.8 cm  F/U outpt with Dr. Valencia  Benign as per patient's daughter    HFPEF  Echo: PHTN, Severe TR  Follows with Dr. Brandon Cavanaugh takes 40 mg PO Lasix at home with minimal improvement    Afib  rate controlled  Hold eliquis d/t bleed  Not taking rate control       CODY on CKD 4  Gentle hydration  Monitor Renal function    HTN  Continue diltiazem  Monitor BP, stable since admission    Hypothyroidism  Continue Levothyroxine    Hypokalemia  Resolved  F/U BMP

## 2021-04-14 NOTE — DISCHARGE NOTE NURSING/CASE MANAGEMENT/SOCIAL WORK - PATIENT PORTAL LINK FT
You can access the FollowMyHealth Patient Portal offered by [facility] by registering at the following website: [email or web address]. By joining [employer]’s FollowMyHealth portal, you will also be able to view your health information using other applications (apps) compatible with our system.

## 2021-04-14 NOTE — PROGRESS NOTE ADULT - SUBJECTIVE AND OBJECTIVE BOX
86 y/o F PMHx HLD, HTN, A-fib on Eliquis, hypothyroidism, CHF, AAA s/p EVAR (2017), left renal artery coil embolization, right renal artery stent and SMA stent (2017), with recent C. diff diagnosis started on PO Vancomycin by PCP on Monday, has been compliant, presents to ED with bright red blood per rectum x1 day.   Patient is accompanied by daughter at bedside who states that the patients toilet was filled with bright red blood on 4 separate instances of diarrhea yesterday. Patient was having non-bloody diarrhea for 3 weeks prior to being started on Vancomycin.    In ER, Vitals were stable. Labs were remarkable for Normocytic anemia, Hyponatremia, hypokalemia, elevated Pro BNP. Patient was US LE was unremarkable. Echo showed Pulmonary HTN, Severe TR. Patient has been admitted for Lower GI bleed. GI was consulted but patient refused Colonoscopy. ID  recommended to treat with Vancomycin for 14 days. Patient is still having diarrhea .     Patient was seen at the bedside, stable.     Constitutional: well-developed; well-groomed; well-nourished  Eyes :conjunctiva clear   Neck supple; no JVD  Back normal shape; ROM intact  Respiratory normal; airway patent; breath sounds equal; good air movement  Cardiovascular; regular rate and rhythm  no rub  no murmur  normal PMI  Gastrointestinal :soft, Nontender, No distension, NBS  Neurological: alert and oriented x 3; responds to pain; responds to verbal commands  Skin; warm and dry; color normal  Musculoskeletal ::normal; ROM intact; no joint swelling; no joint erythema; no joint warmth

## 2021-04-15 LAB
ANION GAP SERPL CALC-SCNC: 10 MMOL/L — SIGNIFICANT CHANGE UP (ref 7–14)
BUN SERPL-MCNC: 47 MG/DL — HIGH (ref 10–20)
CALCIUM SERPL-MCNC: 8.1 MG/DL — LOW (ref 8.5–10.1)
CHLORIDE SERPL-SCNC: 103 MMOL/L — SIGNIFICANT CHANGE UP (ref 98–110)
CO2 SERPL-SCNC: 25 MMOL/L — SIGNIFICANT CHANGE UP (ref 17–32)
CREAT SERPL-MCNC: 1.7 MG/DL — HIGH (ref 0.7–1.5)
GLUCOSE SERPL-MCNC: 91 MG/DL — SIGNIFICANT CHANGE UP (ref 70–99)
HCT VFR BLD CALC: 25.9 % — LOW (ref 37–47)
HGB BLD-MCNC: 8.5 G/DL — LOW (ref 12–16)
MCHC RBC-ENTMCNC: 29.9 PG — SIGNIFICANT CHANGE UP (ref 27–31)
MCHC RBC-ENTMCNC: 32.8 G/DL — SIGNIFICANT CHANGE UP (ref 32–37)
MCV RBC AUTO: 91.2 FL — SIGNIFICANT CHANGE UP (ref 81–99)
NRBC # BLD: 0 /100 WBCS — SIGNIFICANT CHANGE UP (ref 0–0)
PLATELET # BLD AUTO: 254 K/UL — SIGNIFICANT CHANGE UP (ref 130–400)
POTASSIUM SERPL-MCNC: 4.2 MMOL/L — SIGNIFICANT CHANGE UP (ref 3.5–5)
POTASSIUM SERPL-SCNC: 4.2 MMOL/L — SIGNIFICANT CHANGE UP (ref 3.5–5)
RBC # BLD: 2.84 M/UL — LOW (ref 4.2–5.4)
RBC # FLD: 16.5 % — HIGH (ref 11.5–14.5)
SODIUM SERPL-SCNC: 138 MMOL/L — SIGNIFICANT CHANGE UP (ref 135–146)
WBC # BLD: 10.8 K/UL — SIGNIFICANT CHANGE UP (ref 4.8–10.8)
WBC # FLD AUTO: 10.8 K/UL — SIGNIFICANT CHANGE UP (ref 4.8–10.8)

## 2021-04-15 PROCEDURE — 99233 SBSQ HOSP IP/OBS HIGH 50: CPT

## 2021-04-15 RX ADMIN — Medication 25 MICROGRAM(S): at 05:57

## 2021-04-15 RX ADMIN — APIXABAN 2.5 MILLIGRAM(S): 2.5 TABLET, FILM COATED ORAL at 17:48

## 2021-04-15 RX ADMIN — Medication 40 MILLIGRAM(S): at 05:57

## 2021-04-15 RX ADMIN — Medication 125 MILLIGRAM(S): at 05:58

## 2021-04-15 RX ADMIN — Medication 30 MILLIGRAM(S): at 05:58

## 2021-04-15 RX ADMIN — APIXABAN 2.5 MILLIGRAM(S): 2.5 TABLET, FILM COATED ORAL at 05:57

## 2021-04-15 RX ADMIN — Medication 240 MILLIGRAM(S): at 05:57

## 2021-04-15 RX ADMIN — ATORVASTATIN CALCIUM 10 MILLIGRAM(S): 80 TABLET, FILM COATED ORAL at 22:13

## 2021-04-15 RX ADMIN — Medication 125 MILLIGRAM(S): at 11:21

## 2021-04-15 NOTE — CHART NOTE - NSCHARTNOTEFT_GEN_A_CORE
family update note --   Spoke to   daughter in pt's room in great length about patients  diagnosis and prognosis   (daughter Tequila Pritchard ) and she is the giorgi care proxy , She has 9 other siblings . HCP is informed that hospital will communicate  with one person only .  Family is encouraged to ask questions.  at this time  Tequila does not want any colonoscopy , Her c diff is treated with vancomycin  and id on board . Pt is encouraged to get out of bed and do activity as tolerated  with assistance .

## 2021-04-15 NOTE — PROGRESS NOTE ADULT - ASSESSMENT
C. diff Colitis  Patient presented with BRBPR  Continue contact precautions  ID: Continue Vancomycin X 14 Days  GI: Colonoscopy but patient refused  HOLD eliquis for now due to GI bleed   Continue Vancomycin 125 q6 for 10 days total (end on 4/18)  F/U C.diff,  PCR  T/S active, tx for Hgb < 7.0   F/U Family to discuss about Colonsocopy    AAA   s/p EVAR with assoc. renal artery stent/coiling (2017)  Patient is hemodynamically stable  Continue Valsartan    Renal mass  1.8 cm  F/U outpt with Dr. Valencia  Benign as per patient's daughter    HFPEF  Echo: PHTN, Severe TR  Follows with Dr. Taylor  Patient takes 40 mg PO Lasix at home with minimal improvement    Afib  rate controlled  Hold Eliquis due to  bleed  Not taking rate control meds      CODY on CKD 4  Gentle hydration  Monitor Renal function    HTN  Continue diltiazem  Monitor BP, stable since admission    Hypothyroidism  Continue Levothyroxine    Hypokalemia  Resolved  F/U BMP

## 2021-04-15 NOTE — PROGRESS NOTE ADULT - SUBJECTIVE AND OBJECTIVE BOX
84 y/o F PMHx HLD, HTN, A-fib on Eliquis, hypothyroidism, CHF, AAA s/p EVAR (2017), left renal artery coil embolization, right renal artery stent and SMA stent (2017), with recent C. diff diagnosis started on PO Vancomycin by PCP on Monday, has been compliant, presents to ED with bright red blood per rectum x1 day.   Patient is accompanied by daughter at bedside who states that the patients toilet was filled with bright red blood on 4 separate instances of diarrhea yesterday. Patient was having non-bloody diarrhea for 3 weeks prior to being started on Vancomycin.    In ER, Vitals were stable. Labs were remarkable for Normocytic anemia, Hyponatremia, hypokalemia, elevated Pro BNP. Patient was US LE was unremarkable. Echo showed Pulmonary HTN, Severe TR. Patient has been admitted for Lower GI bleed. GI was consulted but patient refused Colonoscopy. ID  recommended to treat with Vancomycin for 14 days. Patient is still having diarrhea . Family is reconsidering to do Colonoscopy.     Patient was seen at the bedside, stable, still having blood with BM.     Constitutional: well-developed; well-groomed; well-nourished  Eyes :conjunctiva clear   Neck supple; no JVD  Back normal shape; ROM intact  Respiratory normal; airway patent; breath sounds equal; good air movement  Cardiovascular; regular rate and rhythm  no rub  no murmur  normal PMI  Gastrointestinal :soft, Nontender, No distension, NBS  Neurological: alert and oriented x 3; responds to pain; responds to verbal commands  Skin; warm and dry; color normal  Musculoskeletal ::normal; ROM intact; no joint swelling; no joint erythema; no joint warmth 86 y/o F PMHx HLD, HTN, A-fib on Eliquis, hypothyroidism, CHF, AAA s/p EVAR (2017), left renal artery coil embolization, right renal artery stent and SMA stent (2017), with recent C. diff diagnosis started on PO Vancomycin by PCP on Monday, has been compliant, presents to ED with bright red blood per rectum x1 day.   Patient is accompanied by daughter at bedside who states that the patients toilet was filled with bright red blood on 4 separate instances of diarrhea yesterday. Patient was having non-bloody diarrhea for 3 weeks prior to being started on Vancomycin.    In ER, Vitals were stable. Labs were remarkable for Normocytic anemia, Hyponatremia, hypokalemia, elevated Pro BNP. US LE was unremarkable. Echo showed Pulmonary HTN, Severe TR. Patient has been admitted for Lower GI bleed. GI was consulted but patient refused Colonoscopy. ID  recommended to treat with Vancomycin for 14 days. Patient is still having diarrhea . Family is reconsidering to do Colonoscopy.     Patient was seen at the bedside, stable, still having blood with BM.     Constitutional: well-developed; well-groomed; well-nourished  Eyes :conjunctiva clear   Neck supple; no JVD  Back normal shape; ROM intact  Respiratory normal; airway patent; breath sounds equal; good air movement  Cardiovascular; regular rate and rhythm  no rub  no murmur  normal PMI  Gastrointestinal :soft, Nontender, No distension, NBS  Neurological: alert and oriented x 3; responds to pain; responds to verbal commands  Skin; warm and dry; color normal  Musculoskeletal ::normal; ROM intact; no joint swelling; no joint erythema; no joint warmth

## 2021-04-16 LAB
ANION GAP SERPL CALC-SCNC: 10 MMOL/L — SIGNIFICANT CHANGE UP (ref 7–14)
BUN SERPL-MCNC: 46 MG/DL — HIGH (ref 10–20)
CALCIUM SERPL-MCNC: 8.2 MG/DL — LOW (ref 8.5–10.1)
CHLORIDE SERPL-SCNC: 101 MMOL/L — SIGNIFICANT CHANGE UP (ref 98–110)
CO2 SERPL-SCNC: 24 MMOL/L — SIGNIFICANT CHANGE UP (ref 17–32)
CREAT SERPL-MCNC: 1.7 MG/DL — HIGH (ref 0.7–1.5)
GLUCOSE SERPL-MCNC: 206 MG/DL — HIGH (ref 70–99)
HCT VFR BLD CALC: 26.4 % — LOW (ref 37–47)
HGB BLD-MCNC: 8.7 G/DL — LOW (ref 12–16)
MCHC RBC-ENTMCNC: 30.5 PG — SIGNIFICANT CHANGE UP (ref 27–31)
MCHC RBC-ENTMCNC: 33 G/DL — SIGNIFICANT CHANGE UP (ref 32–37)
MCV RBC AUTO: 92.6 FL — SIGNIFICANT CHANGE UP (ref 81–99)
NRBC # BLD: 0 /100 WBCS — SIGNIFICANT CHANGE UP (ref 0–0)
PLATELET # BLD AUTO: 255 K/UL — SIGNIFICANT CHANGE UP (ref 130–400)
POTASSIUM SERPL-MCNC: 4.5 MMOL/L — SIGNIFICANT CHANGE UP (ref 3.5–5)
POTASSIUM SERPL-SCNC: 4.5 MMOL/L — SIGNIFICANT CHANGE UP (ref 3.5–5)
RBC # BLD: 2.85 M/UL — LOW (ref 4.2–5.4)
RBC # FLD: 16.8 % — HIGH (ref 11.5–14.5)
SODIUM SERPL-SCNC: 135 MMOL/L — SIGNIFICANT CHANGE UP (ref 135–146)
WBC # BLD: 7.97 K/UL — SIGNIFICANT CHANGE UP (ref 4.8–10.8)
WBC # FLD AUTO: 7.97 K/UL — SIGNIFICANT CHANGE UP (ref 4.8–10.8)

## 2021-04-16 PROCEDURE — 99233 SBSQ HOSP IP/OBS HIGH 50: CPT

## 2021-04-16 RX ORDER — ASCORBIC ACID 60 MG
500 TABLET,CHEWABLE ORAL
Refills: 0 | Status: DISCONTINUED | OUTPATIENT
Start: 2021-04-16 | End: 2021-04-18

## 2021-04-16 RX ORDER — PHENYLEPHRINE-SHARK LIVER OIL-MINERAL OIL-PETROLATUM RECTAL OINTMENT
1 OINTMENT (GRAM) RECTAL
Refills: 0 | Status: DISCONTINUED | OUTPATIENT
Start: 2021-04-16 | End: 2021-04-16

## 2021-04-16 RX ORDER — HYDROCORTISONE 1 %
1 OINTMENT (GRAM) TOPICAL
Refills: 0 | Status: DISCONTINUED | OUTPATIENT
Start: 2021-04-16 | End: 2021-04-18

## 2021-04-16 RX ORDER — VANCOMYCIN HCL 1 G
125 VIAL (EA) INTRAVENOUS EVERY 6 HOURS
Refills: 0 | Status: DISCONTINUED | OUTPATIENT
Start: 2021-04-16 | End: 2021-04-18

## 2021-04-16 RX ORDER — FERROUS SULFATE 325(65) MG
325 TABLET ORAL
Refills: 0 | Status: DISCONTINUED | OUTPATIENT
Start: 2021-04-16 | End: 2021-04-18

## 2021-04-16 RX ADMIN — Medication 125 MILLIGRAM(S): at 01:18

## 2021-04-16 RX ADMIN — Medication 25 MICROGRAM(S): at 06:32

## 2021-04-16 RX ADMIN — Medication 325 MILLIGRAM(S): at 17:51

## 2021-04-16 RX ADMIN — Medication 500 MILLIGRAM(S): at 17:51

## 2021-04-16 RX ADMIN — APIXABAN 2.5 MILLIGRAM(S): 2.5 TABLET, FILM COATED ORAL at 06:32

## 2021-04-16 RX ADMIN — Medication 125 MILLIGRAM(S): at 11:27

## 2021-04-16 RX ADMIN — Medication 125 MILLIGRAM(S): at 06:32

## 2021-04-16 RX ADMIN — Medication 650 MILLIGRAM(S): at 00:31

## 2021-04-16 RX ADMIN — Medication 325 MILLIGRAM(S): at 11:27

## 2021-04-16 RX ADMIN — Medication 30 MILLIGRAM(S): at 06:43

## 2021-04-16 RX ADMIN — Medication 125 MILLIGRAM(S): at 17:50

## 2021-04-16 RX ADMIN — Medication 125 MILLIGRAM(S): at 23:19

## 2021-04-16 RX ADMIN — APIXABAN 2.5 MILLIGRAM(S): 2.5 TABLET, FILM COATED ORAL at 17:51

## 2021-04-16 RX ADMIN — Medication 500 MILLIGRAM(S): at 11:28

## 2021-04-16 RX ADMIN — Medication 650 MILLIGRAM(S): at 07:00

## 2021-04-16 RX ADMIN — Medication 1 SUPPOSITORY(S): at 23:19

## 2021-04-16 RX ADMIN — ATORVASTATIN CALCIUM 10 MILLIGRAM(S): 80 TABLET, FILM COATED ORAL at 21:57

## 2021-04-16 RX ADMIN — Medication 40 MILLIGRAM(S): at 06:32

## 2021-04-16 RX ADMIN — Medication 240 MILLIGRAM(S): at 06:32

## 2021-04-16 NOTE — DIETITIAN INITIAL EVALUATION ADULT. - OTHER CALCULATIONS
wt used: 55kg IBW --?accuracy of CBW kcal: 1375-1650kcal (25-30kcal/kg IBW) protein: 55-66g (1-1.2g/kg IBW) fluids: 1ml/kcal or per LIP

## 2021-04-16 NOTE — DIETITIAN INITIAL EVALUATION ADULT. - OTHER INFO
86 yo F with PMH HLD, HTN, CHF, with recent C. diff diagnosis. presents to ED with bright red blood per rectum x1 day. CODY on CKD4 noted. Family to discuss plan for coloscopy.     Nutrition hx obtained from pt and pt's daughter at bedside. Report at home pt typically has a good appetite, eats most of  her meals. Denies use of oral nutrition supplements, vitamins, or minerals. Reports allergy to shrimp and mushrooms. No Church or cultural food preferences. Denies difficulties chewing or swallowing.     In house appetite is variable, today ate 100% of her breakfast. Yesterday did not eat her lunch. Family bringing in food from home as well. No c/o N+V, pt and daughter report diarrhea seems to be improving. LBM 4/16    UBW 140lbs per pt. Daughter unsure. Pt stable at wt with no recent wt loss. ?accuracy of CBW, unable to weigh pt upon assessment as OOB in chair.     Ht:   64" (per pt)  Wt:  151.4lbs --?accuracy   IBW:  120lbs +/-10%    BMI: 26  kg/m2     Skin: No pressure injuries per RN flow sheets  Edema: +1 R+L leg edema noted per RN flow sheets

## 2021-04-16 NOTE — DIETITIAN INITIAL EVALUATION ADULT. - NAME AND PHONE
Nutrition Interventions: meals and snacks, medical food supplements, vitamin/mineral supplementation RD to monitor diet order, energy intake, body composition, NFPF, renal/electrolyte profile

## 2021-04-16 NOTE — DIETITIAN INITIAL EVALUATION ADULT. - ADD RECOMMEND
Continue soft diet order as tolerated. Add ensure pudding q24hrs and prosourc gelatin plus q24hrs to optimize protein intake. If not contraindicated, order MVI with minerals 500mg q24hrs

## 2021-04-16 NOTE — DIETITIAN INITIAL EVALUATION ADULT. - PERSON TAUGHT/METHOD
Discussed use of oral nutrition supplements to optimize po intake. Discussed high protein snack options family can bring in for the pt./verbal instruction/daughter instructed

## 2021-04-17 LAB
ANION GAP SERPL CALC-SCNC: 11 MMOL/L — SIGNIFICANT CHANGE UP (ref 7–14)
BUN SERPL-MCNC: 43 MG/DL — HIGH (ref 10–20)
CALCIUM SERPL-MCNC: 8.6 MG/DL — SIGNIFICANT CHANGE UP (ref 8.5–10.1)
CHLORIDE SERPL-SCNC: 104 MMOL/L — SIGNIFICANT CHANGE UP (ref 98–110)
CO2 SERPL-SCNC: 24 MMOL/L — SIGNIFICANT CHANGE UP (ref 17–32)
CREAT SERPL-MCNC: 1.4 MG/DL — SIGNIFICANT CHANGE UP (ref 0.7–1.5)
GLUCOSE SERPL-MCNC: 74 MG/DL — SIGNIFICANT CHANGE UP (ref 70–99)
HCT VFR BLD CALC: 27.4 % — LOW (ref 37–47)
HGB BLD-MCNC: 9.3 G/DL — LOW (ref 12–16)
MCHC RBC-ENTMCNC: 31.4 PG — HIGH (ref 27–31)
MCHC RBC-ENTMCNC: 33.9 G/DL — SIGNIFICANT CHANGE UP (ref 32–37)
MCV RBC AUTO: 92.6 FL — SIGNIFICANT CHANGE UP (ref 81–99)
NRBC # BLD: 0 /100 WBCS — SIGNIFICANT CHANGE UP (ref 0–0)
PLATELET # BLD AUTO: 276 K/UL — SIGNIFICANT CHANGE UP (ref 130–400)
POTASSIUM SERPL-MCNC: 4.6 MMOL/L — SIGNIFICANT CHANGE UP (ref 3.5–5)
POTASSIUM SERPL-SCNC: 4.6 MMOL/L — SIGNIFICANT CHANGE UP (ref 3.5–5)
RBC # BLD: 2.96 M/UL — LOW (ref 4.2–5.4)
RBC # FLD: 17 % — HIGH (ref 11.5–14.5)
SODIUM SERPL-SCNC: 139 MMOL/L — SIGNIFICANT CHANGE UP (ref 135–146)
WBC # BLD: 8.2 K/UL — SIGNIFICANT CHANGE UP (ref 4.8–10.8)
WBC # FLD AUTO: 8.2 K/UL — SIGNIFICANT CHANGE UP (ref 4.8–10.8)

## 2021-04-17 PROCEDURE — 99233 SBSQ HOSP IP/OBS HIGH 50: CPT

## 2021-04-17 RX ORDER — URSODIOL 250 MG/1
300 TABLET, FILM COATED ORAL
Refills: 0 | Status: DISCONTINUED | OUTPATIENT
Start: 2021-04-17 | End: 2021-04-18

## 2021-04-17 RX ORDER — PETROLATUM,WHITE
1 JELLY (GRAM) TOPICAL DAILY
Refills: 0 | Status: DISCONTINUED | OUTPATIENT
Start: 2021-04-17 | End: 2021-04-18

## 2021-04-17 RX ADMIN — SODIUM CHLORIDE 60 MILLILITER(S): 9 INJECTION INTRAMUSCULAR; INTRAVENOUS; SUBCUTANEOUS at 18:03

## 2021-04-17 RX ADMIN — Medication 30 MILLIGRAM(S): at 06:07

## 2021-04-17 RX ADMIN — Medication 1 SUPPOSITORY(S): at 18:03

## 2021-04-17 RX ADMIN — SODIUM CHLORIDE 60 MILLILITER(S): 9 INJECTION INTRAMUSCULAR; INTRAVENOUS; SUBCUTANEOUS at 00:27

## 2021-04-17 RX ADMIN — Medication 650 MILLIGRAM(S): at 15:33

## 2021-04-17 RX ADMIN — Medication 1 SUPPOSITORY(S): at 06:00

## 2021-04-17 RX ADMIN — Medication 650 MILLIGRAM(S): at 10:28

## 2021-04-17 RX ADMIN — APIXABAN 2.5 MILLIGRAM(S): 2.5 TABLET, FILM COATED ORAL at 18:03

## 2021-04-17 RX ADMIN — Medication 325 MILLIGRAM(S): at 06:00

## 2021-04-17 RX ADMIN — Medication 125 MILLIGRAM(S): at 06:00

## 2021-04-17 RX ADMIN — URSODIOL 300 MILLIGRAM(S): 250 TABLET, FILM COATED ORAL at 12:24

## 2021-04-17 RX ADMIN — Medication 325 MILLIGRAM(S): at 18:03

## 2021-04-17 RX ADMIN — Medication 125 MILLIGRAM(S): at 12:33

## 2021-04-17 RX ADMIN — Medication 240 MILLIGRAM(S): at 05:59

## 2021-04-17 RX ADMIN — ATORVASTATIN CALCIUM 10 MILLIGRAM(S): 80 TABLET, FILM COATED ORAL at 21:34

## 2021-04-17 RX ADMIN — Medication 25 MICROGRAM(S): at 05:59

## 2021-04-17 RX ADMIN — Medication 500 MILLIGRAM(S): at 18:03

## 2021-04-17 RX ADMIN — Medication 40 MILLIGRAM(S): at 06:04

## 2021-04-17 RX ADMIN — Medication 125 MILLIGRAM(S): at 18:04

## 2021-04-17 RX ADMIN — Medication 500 MILLIGRAM(S): at 06:00

## 2021-04-17 NOTE — PROGRESS NOTE ADULT - ASSESSMENT
C. diff Colitis  Patient presented with BRBPR  Continue contact precautions  ID: Continue Vancomycin X 14 Days  GI: Colonoscopy but patient refused  HOLD eliquis for now due to GI bleed   Continue Vancomycin 125 q6 for 10 days total (end on 4/18)  T/S active, tx for Hgb < 7.0   D/C Contact isolation  Possible discharge tomorrow    CODY on CKD 4  Resolving   Gentle hydration  Monitor Renal function        AAA   s/p EVAR with assoc. renal artery stent/coiling (2017)  Patient is hemodynamically stable  Continue Valsartan    Renal mass  1.8 cm  F/U outpt with Dr. Valencia  Benign as per patient's daughter    HFPEF  Echo: PHTN, Severe TR  Follows with Dr. Taylor  Patient takes 40 mg PO Lasix at home with minimal improvement    Afib  rate controlled  Hold Eliquis due to  bleed  Not taking rate control meds    HTN  Continue diltiazem  Monitor BP, stable since admission    Hypothyroidism  Continue Levothyroxine    Hypokalemia  Resolved  F/U BMP C. diff Colitis  Patient presented with BRBPR  Discontinue contact precautions  ID: Continue Vancomycin X 14 Days  GI: Colonoscopy but patient refused  Continue Eliquis  Continue Vancomycin 125 q6 for 10 days total (end on 4/18)  Possible discharge tomorrow    CODY on CKD 4  Resolving   Gentle hydration  Monitor Renal function        AAA   s/p EVAR with assoc. renal artery stent/coiling (2017)  Patient is hemodynamically stable  Continue Valsartan    Renal mass  1.8 cm renal lesion  F/U outpt with Dr. Valencia  Benign as per patient's daughter    HFPEF  Echo: PHTN, Severe TR  Follows with Dr. Taylor  Patient takes 40 mg PO Lasix at home with minimal improvement    Afib  rate controlled  Hold Eliquis due to  bleed  Not taking rate control meds    HTN  Continue diltiazem  Monitor BP, stable since admission    Hypothyroidism  Continue Levothyroxine    Hypokalemia  Resolved  F/U BMP

## 2021-04-17 NOTE — CHART NOTE - NSCHARTNOTEFT_GEN_A_CORE
Eliquis ordered 2.5mg BID on 4/12/21. Per Dr. Jackson, will continue this dosage and will d/c on home regimen of 5mg BID.     Pt not on home medication Valsartan 160mg QD this admission; BP controlled. Per Dr. Jackson, can d/c pt on home medication.     Will order pt home medication ursodiol. Eliquis ordered 2.5mg BID on 4/12/21. Per Dr. Jackson, will continue this dosage and will d/c on home regimen of 5mg BID.     Pt not on home medication Valsartan 160mg QD this admission; BP controlled. Per Dr. Jackson, can d/c pt on home medication.     Discussed with attending, d/c prednisone.     Will order pt home medication ursodiol. Pt seen and examined at bedside this AM. Pt denies fever chills n/v abd pain HA chest pain SOB. BLE edema improved. Extensive conversation regarding medications and plan of care done with daughter Tequila and patient. All questions answered.     Medication changes as follows:  - Eliquis ordered 2.5mg BID on 4/12/21. Per Dr. Jackson, will continue this dosage and will d/c on home regimen of 5mg BID.   - Pt not on home medication Valsartan 160mg QD this admission; BP controlled. Per Dr. Jackson, can d/c pt on home medication.   - D/c 30mg prednisone  - Ordered pt home medication ursodiol.  - All changes discussed with Dr. Jackson. Pt seen and examined at bedside this AM. Pt denies fever chills n/v abd pain HA chest pain SOB. BLE edema improved. Extensive conversation regarding medications and plan of care done with daughter Tequila and patient. All questions answered.     Medication changes as follows:  - Eliquis ordered 2.5mg BID on 4/12/21. Per Dr. Jackson, will continue this dosage and will d/c on home regimen of 5mg BID.   - Pt not on home medication Valsartan 160mg QD this admission; BP controlled. Per Dr. Jackson, can d/c pt on home medication.   - D/c 30mg prednisone  - Ordered pt home medication ursodiol.  - All changes discussed with Dr. Jackson.    Pt with firm stools, will d/c contact precautions per Dr. Jackson. Cr this AM 1.4, will hydrate overnight and plan for discharge home tomorrow. Will notify family. Daughter Mary Kay or Brenda to visit today. Pt seen and examined at bedside this AM. Pt denies fever chills n/v abd pain HA chest pain SOB. BLE edema improved. Extensive conversation regarding medications and plan of care done with daughter Tequila and patient. All questions answered.     Medication changes as follows:  - Eliquis ordered 2.5mg BID on 4/12/21. Per Dr. Jackson, will continue this dosage and will d/c on home regimen of 5mg BID.   - Pt not on home medication Valsartan 160mg QD this admission; BP controlled. Per Dr. Jackson, can d/c pt on home medication.   - D/c 30mg prednisone  - Ordered pt home medication ursodiol.  - All changes discussed with Dr. Jackson.    Pt with firm stools, will d/c contact precautions per Dr. Jackson. Cr this AM 1.4, will hydrate overnight and plan for discharge home tomorrow. Will notify family.     Extensive conversation with oumar Pisano at bedside; Aliya states the patient did not meet a physician until hospital day 7; this provider apologized for the possible miscommunication but assured Aliya that doctors and ACPs must round on patients daily. Informed of resolving CODY (Cr 1.4 today), likely discharge tomorrow after an additional night of hydration. Informed Aliya of Hgb this AM 9.3. All questions answered.

## 2021-04-17 NOTE — PROGRESS NOTE ADULT - SUBJECTIVE AND OBJECTIVE BOX
84 y/o F PMHx HLD, HTN, A-fib on Eliquis, hypothyroidism, CHF, AAA s/p EVAR (2017), left renal artery coil embolization, right renal artery stent and SMA stent (2017), with recent C. diff diagnosis started on PO Vancomycin by PCP on Monday, has been compliant, presents to ED with bright red blood per rectum x1 day.   Patient is accompanied by daughter at bedside who states that the patients toilet was filled with bright red blood on 4 separate instances of diarrhea yesterday. Patient was having non-bloody diarrhea for 3 weeks prior to being started on Vancomycin.    In ER, Vitals were stable. Labs were remarkable for Normocytic anemia, Hyponatremia, hypokalemia, elevated Pro BNP. US LE was unremarkable. Echo showed Pulmonary HTN, Severe TR. Patient has been admitted for Lower GI bleed. GI was consulted but patient refused Colonoscopy. ID  recommended to treat with Vancomycin for 14 days. Patient is having formed stools now. CODY is resolving. Will anticipate discharge tomorrow.     Patient was seen at the bedside, stable. with no acute issues.     Constitutional: well-developed; well-groomed; well-nourished  Eyes :conjunctiva clear   Neck supple; no JVD  Back normal shape; ROM intact  Respiratory normal; airway patent; breath sounds equal; good air movement  Cardiovascular; regular rate and rhythm  no rub  no murmur  normal PMI  Gastrointestinal :soft, Nontender, No distension, NBS  Neurological: alert and oriented x 3; responds to pain; responds to verbal commands  Skin; warm and dry; color normal  Musculoskeletal ::normal; ROM intact; no joint swelling; no joint erythema; no joint warmth

## 2021-04-18 ENCOUNTER — TRANSCRIPTION ENCOUNTER (OUTPATIENT)
Age: 86
End: 2021-04-18

## 2021-04-18 VITALS
SYSTOLIC BLOOD PRESSURE: 130 MMHG | HEART RATE: 50 BPM | TEMPERATURE: 97 F | DIASTOLIC BLOOD PRESSURE: 60 MMHG | RESPIRATION RATE: 18 BRPM

## 2021-04-18 PROCEDURE — 99238 HOSP IP/OBS DSCHRG MGMT 30/<: CPT

## 2021-04-18 RX ORDER — ASCORBIC ACID 60 MG
1 TABLET,CHEWABLE ORAL
Qty: 60 | Refills: 0
Start: 2021-04-18 | End: 2021-05-17

## 2021-04-18 RX ORDER — FERROUS SULFATE 325(65) MG
1 TABLET ORAL
Qty: 60 | Refills: 0
Start: 2021-04-18 | End: 2021-05-17

## 2021-04-18 RX ORDER — VANCOMYCIN HCL 1 G
1 VIAL (EA) INTRAVENOUS
Qty: 16 | Refills: 0
Start: 2021-04-18 | End: 2021-04-21

## 2021-04-18 RX ADMIN — Medication 1 APPLICATION(S): at 13:06

## 2021-04-18 RX ADMIN — APIXABAN 2.5 MILLIGRAM(S): 2.5 TABLET, FILM COATED ORAL at 07:07

## 2021-04-18 RX ADMIN — Medication 125 MILLIGRAM(S): at 07:09

## 2021-04-18 RX ADMIN — Medication 500 MILLIGRAM(S): at 07:06

## 2021-04-18 RX ADMIN — URSODIOL 300 MILLIGRAM(S): 250 TABLET, FILM COATED ORAL at 13:05

## 2021-04-18 RX ADMIN — Medication 325 MILLIGRAM(S): at 07:07

## 2021-04-18 RX ADMIN — Medication 240 MILLIGRAM(S): at 07:08

## 2021-04-18 RX ADMIN — Medication 40 MILLIGRAM(S): at 07:07

## 2021-04-18 RX ADMIN — Medication 125 MILLIGRAM(S): at 07:06

## 2021-04-18 RX ADMIN — Medication 125 MILLIGRAM(S): at 13:05

## 2021-04-18 RX ADMIN — Medication 25 MICROGRAM(S): at 07:08

## 2021-04-18 NOTE — DISCHARGE NOTE PROVIDER - NSDCMRMEDTOKEN_GEN_ALL_CORE_FT
ascorbic acid 250 mg oral tablet, chewable: 1 tab(s) chewed 2 times a day   atorvastatin 10 mg oral tablet: 1 tab(s) orally once a day  dilTIAZem 240 mg/24 hours oral tablet, extended release: 1 tab(s) orally once a day  Eliquis 5 mg oral tablet: 1 tab(s) orally 2 times a day  ferrous sulfate 325 mg (65 mg elemental iron) oral tablet: 1 tab(s) orally 2 times a day   furosemide 40 mg oral tablet: 1 tab(s) orally once a day  Synthroid 25 mcg (0.025 mg) oral tablet: 1 tab(s) orally once a day  valsartan 160 mg oral capsule: 1 tab(s) orally once a day  vancomycin 125 mg oral capsule: 1 cap(s) orally every 6 hours

## 2021-04-18 NOTE — PROGRESS NOTE ADULT - SUBJECTIVE AND OBJECTIVE BOX
86 y/o F PMHx HLD, HTN, A-fib on Eliquis, hypothyroidism, CHF, AAA s/p EVAR (2017), left renal artery coil embolization, right renal artery stent and SMA stent (2017), with recent C. diff diagnosis started on PO Vancomycin by PCP on Monday, has been compliant, presents to ED with bright red blood per rectum x1 day.   Patient is accompanied by daughter at bedside who states that the patients toilet was filled with bright red blood on 4 separate instances of diarrhea yesterday. Patient was having non-bloody diarrhea for 3 weeks prior to being started on Vancomycin.    In ER, Vitals were stable. Labs were remarkable for Normocytic anemia, Hyponatremia, hypokalemia, elevated Pro BNP. US LE was unremarkable. Echo showed Pulmonary HTN, Severe TR. Patient has been admitted for Lower GI bleed. GI was consulted but patient refused Colonoscopy. ID  recommended to treat with Vancomycin for 14 days. Patient is having formed stools now. CODY is resolving. Will discharge today to follow up outpatient with PMD and GI.    Patient was seen at the bedside, stable. with no acute issues.     Constitutional: well-developed; well-groomed; well-nourished  Eyes :conjunctiva clear   Neck supple; no JVD  Back normal shape; ROM intact  Respiratory normal; airway patent; breath sounds equal; good air movement  Cardiovascular; regular rate and rhythm  no rub  no murmur  normal PMI  Gastrointestinal :soft, Nontender, No distension, NBS  Neurological: alert and oriented x 3; responds to pain; responds to verbal commands  Skin; warm and dry; color normal  Musculoskeletal ::normal; ROM intact; no joint swelling; no joint erythema; no joint warmth

## 2021-04-18 NOTE — DISCHARGE NOTE PROVIDER - NSDCCPCAREPLAN_GEN_ALL_CORE_FT
PRINCIPAL DISCHARGE DIAGNOSIS  Diagnosis: Clostridium difficile colitis  Assessment and Plan of Treatment:       SECONDARY DISCHARGE DIAGNOSES  Diagnosis: Hypokalemia  Assessment and Plan of Treatment:     Diagnosis: Bright red blood per rectum  Assessment and Plan of Treatment:     Diagnosis: CODY (acute kidney injury)  Assessment and Plan of Treatment:

## 2021-04-18 NOTE — PROGRESS NOTE ADULT - REASON FOR ADMISSION
GI bleed
GI bleed  Reason for Continued Hospitalization: Pending improvement
GI bleed
GI bleed
GI bleed  Discharge today
GI bleed  Reason for Continued Hospitalization: Pending improvement

## 2021-04-18 NOTE — PROGRESS NOTE ADULT - NSICDXPILOT_GEN_ALL_CORE
[place]

## 2021-04-18 NOTE — PROGRESS NOTE ADULT - ASSESSMENT
C. diff Colitis  Patient presented with BRBPR  Discontinued contact precautions  ID: Continue Vancomycin X 14 Days  GI: Colonoscopy but patient refused  Continue Eliquis  Continue Vancomycin 125 q6 for 10 days total (end on 4/18)  Discharge today    CODY on CKD 4  Resolving   Gentle hydration  Monitor Renal function        AAA   s/p EVAR with assoc. renal artery stent/coiling (2017)  Patient is hemodynamically stable  Continue Valsartan    Renal mass  1.8 cm renal lesion  F/U outpt with Dr. Valencia  Benign as per patient's daughter    HFPEF  Echo: PHTN, Severe TR  Follows with Dr. Taylor  Patient takes 40 mg PO Lasix at home with minimal improvement    Afib  rate controlled  Hold Eliquis due to  bleed  Not taking rate control meds    HTN  Continue diltiazem  Monitor BP, stable since admission    Hypothyroidism  Continue Levothyroxine    Hypokalemia  Resolved  F/U BMP

## 2021-04-18 NOTE — DISCHARGE NOTE PROVIDER - CARE PROVIDER_API CALL
Ban Fine)  Gastroenterology; Internal Medicine  [address] [place] [address]  Phone: [phone]  Fax: [phone]  Follow Up Time: 1 month

## 2021-04-18 NOTE — PROGRESS NOTE ADULT - PROVIDER SPECIALTY LIST ADULT
Gastroenterology
Hospitalist
Infectious Disease

## 2021-04-22 ENCOUNTER — EMERGENCY (EMERGENCY)
Facility: HOSPITAL | Age: 86
LOS: 0 days | Discharge: HOME | End: 2021-04-22
Attending: EMERGENCY MEDICINE | Admitting: EMERGENCY MEDICINE
Payer: MEDICARE

## 2021-04-22 VITALS
DIASTOLIC BLOOD PRESSURE: 67 MMHG | OXYGEN SATURATION: 100 % | WEIGHT: 169.98 LBS | TEMPERATURE: 97 F | SYSTOLIC BLOOD PRESSURE: 150 MMHG | HEART RATE: 86 BPM | RESPIRATION RATE: 19 BRPM

## 2021-04-22 VITALS
HEART RATE: 80 BPM | SYSTOLIC BLOOD PRESSURE: 136 MMHG | DIASTOLIC BLOOD PRESSURE: 70 MMHG | RESPIRATION RATE: 18 BRPM | TEMPERATURE: 98 F | OXYGEN SATURATION: 97 %

## 2021-04-22 DIAGNOSIS — N28.9 DISORDER OF KIDNEY AND URETER, UNSPECIFIED: ICD-10-CM

## 2021-04-22 DIAGNOSIS — Z88.0 ALLERGY STATUS TO PENICILLIN: ICD-10-CM

## 2021-04-22 DIAGNOSIS — Z87.39 PERSONAL HISTORY OF OTHER DISEASES OF THE MUSCULOSKELETAL SYSTEM AND CONNECTIVE TISSUE: ICD-10-CM

## 2021-04-22 DIAGNOSIS — I50.9 HEART FAILURE, UNSPECIFIED: ICD-10-CM

## 2021-04-22 DIAGNOSIS — D62 ACUTE POSTHEMORRHAGIC ANEMIA: ICD-10-CM

## 2021-04-22 DIAGNOSIS — I48.20 CHRONIC ATRIAL FIBRILLATION, UNSPECIFIED: ICD-10-CM

## 2021-04-22 DIAGNOSIS — I13.0 HYPERTENSIVE HEART AND CHRONIC KIDNEY DISEASE WITH HEART FAILURE AND STAGE 1 THROUGH STAGE 4 CHRONIC KIDNEY DISEASE, OR UNSPECIFIED CHRONIC KIDNEY DISEASE: ICD-10-CM

## 2021-04-22 DIAGNOSIS — N17.9 ACUTE KIDNEY FAILURE, UNSPECIFIED: ICD-10-CM

## 2021-04-22 DIAGNOSIS — I27.20 PULMONARY HYPERTENSION, UNSPECIFIED: ICD-10-CM

## 2021-04-22 DIAGNOSIS — Z79.899 OTHER LONG TERM (CURRENT) DRUG THERAPY: ICD-10-CM

## 2021-04-22 DIAGNOSIS — R60.0 LOCALIZED EDEMA: ICD-10-CM

## 2021-04-22 DIAGNOSIS — Z88.8 ALLERGY STATUS TO OTHER DRUGS, MEDICAMENTS AND BIOLOGICAL SUBSTANCES STATUS: ICD-10-CM

## 2021-04-22 DIAGNOSIS — I50.33 ACUTE ON CHRONIC DIASTOLIC (CONGESTIVE) HEART FAILURE: ICD-10-CM

## 2021-04-22 DIAGNOSIS — L97.921 NON-PRESSURE CHRONIC ULCER OF UNSPECIFIED PART OF LEFT LOWER LEG LIMITED TO BREAKDOWN OF SKIN: ICD-10-CM

## 2021-04-22 DIAGNOSIS — N18.4 CHRONIC KIDNEY DISEASE, STAGE 4 (SEVERE): ICD-10-CM

## 2021-04-22 DIAGNOSIS — E87.6 HYPOKALEMIA: ICD-10-CM
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
## 2021-04-22 DIAGNOSIS — R10.9 UNSPECIFIED ABDOMINAL PAIN: ICD-10-CM

## 2021-04-22 DIAGNOSIS — A04.72 ENTEROCOLITIS DUE TO CLOSTRIDIUM DIFFICILE, NOT SPECIFIED AS RECURRENT: ICD-10-CM

## 2021-04-22 DIAGNOSIS — Z79.01 LONG TERM (CURRENT) USE OF ANTICOAGULANTS: ICD-10-CM

## 2021-04-22 DIAGNOSIS — Z91.013 ALLERGY TO SEAFOOD: ICD-10-CM

## 2021-04-22 DIAGNOSIS — E87.1 HYPO-OSMOLALITY AND HYPONATREMIA: ICD-10-CM

## 2021-04-22 DIAGNOSIS — E03.9 HYPOTHYROIDISM, UNSPECIFIED: ICD-10-CM

## 2021-04-22 DIAGNOSIS — M10.9 GOUT, UNSPECIFIED: ICD-10-CM

## 2021-04-22 DIAGNOSIS — R58 HEMORRHAGE, NOT ELSEWHERE CLASSIFIED: ICD-10-CM

## 2021-04-22 DIAGNOSIS — M79.661 PAIN IN RIGHT LOWER LEG: ICD-10-CM

## 2021-04-22 DIAGNOSIS — E78.5 HYPERLIPIDEMIA, UNSPECIFIED: ICD-10-CM
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
## 2021-04-22 DIAGNOSIS — I07.1 RHEUMATIC TRICUSPID INSUFFICIENCY: ICD-10-CM

## 2021-04-22 DIAGNOSIS — Z87.891 PERSONAL HISTORY OF NICOTINE DEPENDENCE: ICD-10-CM

## 2021-04-22 DIAGNOSIS — K64.4 RESIDUAL HEMORRHOIDAL SKIN TAGS: ICD-10-CM

## 2021-04-22 DIAGNOSIS — Z91.040 LATEX ALLERGY STATUS: ICD-10-CM

## 2021-04-22 PROCEDURE — 99283 EMERGENCY DEPT VISIT LOW MDM: CPT | Mod: 25

## 2021-04-22 PROCEDURE — 12001 RPR S/N/AX/GEN/TRNK 2.5CM/<: CPT

## 2021-04-22 NOTE — ED PROVIDER NOTE - NSFOLLOWUPINSTRUCTIONS_ED_ALL_ED_FT
Varicose Veins  Varicose veins are veins that have become enlarged, bulged, and twisted. They most often appear in the legs.    What are the causes?  This condition is caused by damage to the valves in the vein. These valves help blood return to your heart. When they are damaged and they stop working properly, blood may flow backward and back up in the veins near the skin, causing the veins to get larger and appear twisted.    The condition can result from any issue that causes blood to back up, like pregnancy, prolonged standing, or obesity.    What increases the risk?  This condition is more likely to develop in people who are:  On their feet a lot.  Pregnant.  Overweight.  What are the signs or symptoms?  Symptoms of this condition include:  Bulging, twisted, and bluish veins.  A feeling of heaviness. This may be worse at the end of the day.  Leg pain. This may be worse at the end of the day.  Swelling in the leg.  Changes in skin color over the veins.  How is this diagnosed?  This condition may be diagnosed based on your symptoms, a physical exam, and an ultrasound test.    How is this treated?  Treatment for this condition may involve:  Avoiding sitting or standing in one position for long periods of time.  Wearing compression stockings. These stockings help to prevent blood clots and reduce swelling in the legs.  Raising (elevating) the legs when resting.  Losing weight.  Exercising regularly.  If you have persistent symptoms or want to improve the way your varicose veins look, you may choose to have a procedure to close the varicose veins off or to remove them.    Treatments to close off the veins include:  Sclerotherapy. In this treatment, a solution is injected into a vein to close it off.  Laser treatment. In this treatment, the vein is heated with a laser to close it off.  Radiofrequency vein ablation. In this treatment, an electrical current produced by radio waves is used to close off the vein.  Treatments to remove the veins include:  Phlebectomy. In this treatment, the veins are removed through small incisions made over the veins.  Vein ligation and stripping. In this treatment, incisions are made over the veins. The veins are then removed after being tied (ligated) with stitches (sutures).  Follow these instructions at home:  Activity     Walk as much as possible. Walking increases blood flow. This helps blood return to the heart and takes pressure off your veins. It also increases your cardiovascular strength.  Follow your health care provider's instructions about exercising.  Do not stand or sit in one position for a long period of time.  Do not sit with your legs crossed.  Rest with your legs raised during the day.  General instructions     Follow any diet instructions given to you by your health care provider.  Wear compression stockings as directed by your health care provider. Do not wear other kinds of tight clothing around your legs, pelvis, or waist.  Elevate your legs at night to above the level of your heart.  If you get a cut in the skin over the varicose vein and the vein bleeds:  Lie down with your leg raised.  Apply firm pressure to the cut with a clean cloth until the bleeding stops.  Place a bandage (dressing) on the cut.  Contact a health care provider if:  The skin around your varicose veins starts to break down.  You have pain, redness, tenderness, or hard swelling over a vein.  You are uncomfortable because of pain.  You get a cut in the skin over a varicose vein and it will not stop bleeding.    Summary  Varicose veins are veins that have become enlarged, bulged, and twisted. They most often appear in the legs.  This condition is caused by damage to the valves in the vein. These valves help blood return to your heart.  Treatment for this condition includes frequent movements, wearing compression stockings, losing weight, and exercising regularly. In some cases, procedures are done to close off or remove the veins.  Treatment for this condition may include wearing compression stockings, elevating the legs, losing weight, and engaging in regular activity. In some cases, procedures are done to close off or remove the veins.  This information is not intended to replace advice given to you by your health care provider. Make sure you discuss any questions you have with your health care provider

## 2021-04-22 NOTE — ED PROVIDER NOTE - NS ED ROS FT
Review of Systems    Constitutional: (-) fever/ chills   Cardiovascular: (-) chest pain, (-) syncope (-) palpitations  Respiratory: (-) cough, (-) shortness of breath  Gastrointestinal: (-) vomiting, (-) diarrhea (-) abdominal pain  Musculoskeletal:  (-) back pain, (-) joint pain   Integumentary: (+)bleeding wound left leg

## 2021-04-22 NOTE — ED PROVIDER NOTE - OBJECTIVE STATEMENT
84 y/o female CHF, CAD, CKD on xarelto presents to the ED with spontaneous bleeding vessel to left lower leg prior to arrival. patient recently discharged from inpatient and just restarted on lasix last night. patient with increasing pedal edema. patient with chronic ulceration to posterior left lower leg medial aspect x 1 month. Today, as patient was transferring , large amount of spontaneous bleeding occurred. patient denies any tingling to foot. EMS applied pressure bandage .

## 2021-04-22 NOTE — ED PROVIDER NOTE - CARE PROVIDER_API CALL
Abdi Kraft)  Vascular Surgery  [address] [place] [address]. [address] [place] [address]  Phone: [phone]  Fax: [phone]  Follow Up Time:

## 2021-04-22 NOTE — ED PROVIDER NOTE - CLINICAL SUMMARY MEDICAL DECISION MAKING FREE TEXT BOX
Bleeding controlled.  Pt observed in ED, no further bleeding  Will need to follow up with Dr Kraft of Vascular. Pt instructed to return if any worsening symptoms or concerns.  They verbalize understanding.

## 2021-04-22 NOTE — ED PROVIDER NOTE - ATTENDING CONTRIBUTION TO CARE
84 yo F PMHx noted presents with daughter via EMS for bleeding form left ankle that started tonight.  On exam pt in NAD alert and awake, + b/l pedal edema, + bleeding varicosity to left medial ankle,

## 2021-04-22 NOTE — ED ADULT TRIAGE NOTE - CHIEF COMPLAINT QUOTE
BIBA pt was walking and her varicose vein started to bleed as per EMS . left leg. EMS states pt pulse ox 90-93%RA. EMS applied 4lnc

## 2021-04-22 NOTE — ED PROVIDER NOTE - PHYSICAL EXAMINATION
Vital Signs: I have reviewed the initial vital signs.  Constitutional: elderly and frail  Head: atraumatic and normocephalic  Eyes:clear conjunctiva  ENT: MMM  Cardiovascular: regular rate, regular rhythm, well-perfused extremities  Respiratory: unlabored respiratory effort, clear to auscultation bilaterally    msk: no bony tenderness left lower leg, ankle, foot, good rom of toes , cap refill in tact  skin: punctate bleeding vessel posterior left medial ankle with surrounding healing ulceration, no crepitation, surrounding erythema   ;

## 2021-04-22 NOTE — ED PROVIDER NOTE - PATIENT PORTAL LINK FT
You can access the FollowMyHealth Patient Portal offered by [facility] by registering at the following website: [email or web address]. By joining [employer]’s FollowMyHealth portal, you will also be able to view your health information using other applications (apps) compatible with our system.

## 2021-04-23 PROBLEM — I10 ESSENTIAL (PRIMARY) HYPERTENSION: Chronic | Status: ACTIVE | Noted: 2021-04-09

## 2021-04-23 PROBLEM — M10.9 GOUT, UNSPECIFIED: Chronic | Status: ACTIVE | Noted: 2021-04-09

## 2021-04-23 PROBLEM — N18.4 CHRONIC KIDNEY DISEASE, STAGE 4 (SEVERE): Chronic | Status: ACTIVE | Noted: 2021-04-09

## 2021-04-23 PROBLEM — I50.9 HEART FAILURE, UNSPECIFIED: Chronic | Status: ACTIVE | Noted: 2021-04-09

## 2021-04-23 NOTE — ED PROCEDURE NOTE - NS ED ATTENDING STATEMENT MOD
Problem: Adult Inpatient Plan of Care  Goal: Plan of Care Review  Outcome: Ongoing (interventions implemented as appropriate)  Bilateral groin sites remain CDI, no bleeding or hematoma noted.  Pt ambulated in hallway today, tolerated well.  Will cont to monitor.         I have personally performed a face to face diagnostic evaluation on this patient. I have reviewed the ACP note and agree with the history, exam and plan of care, except as noted.

## 2021-04-24 ENCOUNTER — EMERGENCY (EMERGENCY)
Facility: HOSPITAL | Age: 86
LOS: 0 days | Discharge: HOME | End: 2021-04-24
Attending: EMERGENCY MEDICINE | Admitting: EMERGENCY MEDICINE
Payer: MEDICARE

## 2021-04-24 VITALS
HEIGHT: 64 IN | SYSTOLIC BLOOD PRESSURE: 109 MMHG | HEART RATE: 66 BPM | RESPIRATION RATE: 20 BRPM | DIASTOLIC BLOOD PRESSURE: 59 MMHG | TEMPERATURE: 97 F | WEIGHT: 139.99 LBS | OXYGEN SATURATION: 100 %

## 2021-04-24 DIAGNOSIS — Z79.899 OTHER LONG TERM (CURRENT) DRUG THERAPY: ICD-10-CM

## 2021-04-24 DIAGNOSIS — M79.672 PAIN IN LEFT FOOT: ICD-10-CM

## 2021-04-24 DIAGNOSIS — Z79.01 LONG TERM (CURRENT) USE OF ANTICOAGULANTS: ICD-10-CM
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
## 2021-04-24 DIAGNOSIS — N18.4 CHRONIC KIDNEY DISEASE, STAGE 4 (SEVERE): ICD-10-CM

## 2021-04-24 DIAGNOSIS — Z91.013 ALLERGY TO SEAFOOD: ICD-10-CM

## 2021-04-24 DIAGNOSIS — I13.0 HYPERTENSIVE HEART AND CHRONIC KIDNEY DISEASE WITH HEART FAILURE AND STAGE 1 THROUGH STAGE 4 CHRONIC KIDNEY DISEASE, OR UNSPECIFIED CHRONIC KIDNEY DISEASE: ICD-10-CM
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
## 2021-04-24 DIAGNOSIS — S81.802A UNSPECIFIED OPEN WOUND, LEFT LOWER LEG, INITIAL ENCOUNTER: ICD-10-CM

## 2021-04-24 DIAGNOSIS — Z91.040 LATEX ALLERGY STATUS: ICD-10-CM

## 2021-04-24 DIAGNOSIS — I50.9 HEART FAILURE, UNSPECIFIED: ICD-10-CM

## 2021-04-24 DIAGNOSIS — Z88.0 ALLERGY STATUS TO PENICILLIN: ICD-10-CM

## 2021-04-24 PROCEDURE — 99283 EMERGENCY DEPT VISIT LOW MDM: CPT

## 2021-04-24 RX ORDER — ATORVASTATIN CALCIUM 80 MG/1
1 TABLET, FILM COATED ORAL
Qty: 0 | Refills: 0 | DISCHARGE

## 2021-04-24 RX ORDER — VALSARTAN 80 MG/1
1 TABLET ORAL
Qty: 0 | Refills: 0 | DISCHARGE

## 2021-04-24 RX ORDER — FUROSEMIDE 40 MG
1 TABLET ORAL
Qty: 0 | Refills: 0 | DISCHARGE

## 2021-04-24 RX ORDER — APIXABAN 2.5 MG/1
1 TABLET, FILM COATED ORAL
Qty: 0 | Refills: 0 | DISCHARGE

## 2021-04-24 RX ORDER — LEVOTHYROXINE SODIUM 125 MCG
1 TABLET ORAL
Qty: 0 | Refills: 0 | DISCHARGE

## 2021-04-24 RX ORDER — DILTIAZEM HCL 120 MG
1 CAPSULE, EXT RELEASE 24 HR ORAL
Qty: 0 | Refills: 0 | DISCHARGE

## 2021-04-24 NOTE — ED PROVIDER NOTE - SKIN WOUND DESCRIPTION
left ankle has open sore, not infected, No bleeding from previous site , sutures in place, New site above previous site noted at bleeding,

## 2021-04-24 NOTE — ED ADULT TRIAGE NOTE - CHIEF COMPLAINT QUOTE
Bleeding left foot.  Per patient, she was treated in ED a few days ago for bleeding vein and is bleeding again

## 2021-04-24 NOTE — ED PROVIDER NOTE - CARE PROVIDER_API CALL
Osvaldo Munroe  VASCULAR SURGERY  [address] [place] [address]  Phone: [phone]  Fax: [phone]  Follow Up Time: 1-3 Days

## 2021-04-24 NOTE — ED PROVIDER NOTE - ATTENDING CONTRIBUTION TO CARE
I personally evaluated the patient. I reviewed the Resident’s or Physician Assistant’s note (as assigned above), and agree with the findings and plan except as documented in my note.  Chart reviewed.  H/O CKD, CXF on Xarelto, presents with spontaneous bleeding left ankle.  Exam shows small ulcer left medial ankle with bleeding, neurovascular intact.

## 2021-04-24 NOTE — ED PROVIDER NOTE - NSFOLLOWUPINSTRUCTIONS_ED_ALL_ED_FT
keep clean and dry 24 hours, See your vascular MD this week as  planned, Keep leg elevated as much as possible,

## 2021-04-24 NOTE — ED PROVIDER NOTE - PATIENT PORTAL LINK FT
You can access the FollowMyHealth Patient Portal offered by [facility] by registering at the following website: [email or web address]. By joining [employer]’s FollowMyHealth portal, you will also be able to view your health information using other applications (apps) compatible with our system.

## 2021-04-24 NOTE — ED PROVIDER NOTE - NS ED ATTENDING STATEMENT MOD
Progress Notes by Sonny Navarrete at 09/26/17 09:16 AM     Author:  Sonny Navarrete Service:  (none) Author Type:  Patient      Filed:  09/26/17 09:16 AM Encounter Date:  9/22/2017 Status:  Signed     :  Sonny Navarrete (Patient )            Called patient[CV1.1T] and left 3rd message on [employer] that a member of the household has physician orders for therapy and to call back to schedule appointments at [phone]. Sent trying to reach you letter.  Routing to ordering physician as FYI.[CV1.1M]          Revision History        User Key Date/Time User Provider Type Action    > CV1.1 09/26/17 09:16 AM Sonny Navarrete Patient  Sign    M - Manual, T - Template Attending with

## 2021-04-24 NOTE — ED PROVIDER NOTE - CLINICAL SUMMARY MEDICAL DECISION MAKING FREE TEXT BOX
Sutured wound with control of bleeding.  Quik clot dressing and Ace wrap applied.  Given wound care instructions.

## 2021-04-24 NOTE — ED PROCEDURE NOTE - GENERAL PROCEDURE DETAILS
3 Vicryl 4-0 rapide placed to tie off bleeding site, Bleeding controlled, quick clot dressing placed

## 2021-04-29 ENCOUNTER — APPOINTMENT (OUTPATIENT)
Dept: VASCULAR SURGERY | Facility: CLINIC | Age: 86
End: 2021-04-29
Payer: MEDICARE

## 2021-04-29 PROCEDURE — 29580 STRAPPING UNNA BOOT: CPT | Mod: 50

## 2021-04-29 PROCEDURE — 99072 ADDL SUPL MATRL&STAF TM PHE: CPT

## 2021-04-29 RX ORDER — SILVER SULFADIAZINE 10 MG/G
1 CREAM TOPICAL DAILY
Qty: 1 | Refills: 1 | Status: ACTIVE | COMMUNITY
Start: 2021-04-29 | End: 1900-01-01

## 2021-04-29 NOTE — ASSESSMENT
[FreeTextEntry1] : Ms. 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 \par \par She presents for follow up, was seen in ED last week for bleeding varix and had suture placed in the LLE.\par \par Left LE suture was removed and Unna boot placed to bilateral lower extremities for wound care. She will follow up in one weeks time.

## 2021-04-30 ENCOUNTER — EMERGENCY (EMERGENCY)
Facility: HOSPITAL | Age: 86
LOS: 0 days | Discharge: HOME | End: 2021-05-01
Attending: EMERGENCY MEDICINE | Admitting: EMERGENCY MEDICINE
Payer: MEDICARE

## 2021-04-30 VITALS
HEIGHT: 64 IN | TEMPERATURE: 98 F | DIASTOLIC BLOOD PRESSURE: 68 MMHG | HEART RATE: 69 BPM | RESPIRATION RATE: 17 BRPM | OXYGEN SATURATION: 98 % | SYSTOLIC BLOOD PRESSURE: 112 MMHG

## 2021-04-30 DIAGNOSIS — E78.5 HYPERLIPIDEMIA, UNSPECIFIED: ICD-10-CM

## 2021-04-30 DIAGNOSIS — J30.1 ALLERGIC RHINITIS DUE TO POLLEN: ICD-10-CM

## 2021-04-30 DIAGNOSIS — Z88.0 ALLERGY STATUS TO PENICILLIN: ICD-10-CM

## 2021-04-30 DIAGNOSIS — D50.0 IRON DEFICIENCY ANEMIA SECONDARY TO BLOOD LOSS (CHRONIC): ICD-10-CM

## 2021-04-30 DIAGNOSIS — E03.9 HYPOTHYROIDISM, UNSPECIFIED: ICD-10-CM

## 2021-04-30 DIAGNOSIS — N18.4 CHRONIC KIDNEY DISEASE, STAGE 4 (SEVERE): ICD-10-CM

## 2021-04-30 DIAGNOSIS — I83.891 VARICOSE VEINS OF RIGHT LOWER EXTREMITY WITH OTHER COMPLICATIONS: ICD-10-CM

## 2021-04-30 DIAGNOSIS — I13.0 HYPERTENSIVE HEART AND CHRONIC KIDNEY DISEASE WITH HEART FAILURE AND STAGE 1 THROUGH STAGE 4 CHRONIC KIDNEY DISEASE, OR UNSPECIFIED CHRONIC KIDNEY DISEASE: ICD-10-CM

## 2021-04-30 DIAGNOSIS — Z91.040 LATEX ALLERGY STATUS: ICD-10-CM

## 2021-04-30 DIAGNOSIS — Z91.013 ALLERGY TO SEAFOOD: ICD-10-CM

## 2021-04-30 DIAGNOSIS — I48.91 UNSPECIFIED ATRIAL FIBRILLATION: ICD-10-CM

## 2021-04-30 DIAGNOSIS — M10.9 GOUT, UNSPECIFIED: ICD-10-CM

## 2021-04-30 DIAGNOSIS — D63.1 ANEMIA IN CHRONIC KIDNEY DISEASE: ICD-10-CM

## 2021-04-30 LAB
HCT VFR BLD CALC: 15.1 % — LOW (ref 37–47)
HGB BLD-MCNC: 5 G/DL — CRITICAL LOW (ref 12–16)
MCHC RBC-ENTMCNC: 33.1 G/DL — SIGNIFICANT CHANGE UP (ref 32–37)
MCHC RBC-ENTMCNC: 80.1 PG — HIGH (ref 27–31)
MCV RBC AUTO: 95.7 FL — SIGNIFICANT CHANGE UP (ref 81–99)
NRBC # BLD: 0 /100 WBCS — SIGNIFICANT CHANGE UP (ref 0–0)
PLATELET # BLD AUTO: 202 K/UL — SIGNIFICANT CHANGE UP (ref 130–400)
RBC # BLD: 1.57 M/UL — LOW (ref 4.2–5.4)
RBC # FLD: 18.6 % — HIGH (ref 11.5–14.5)
WBC # BLD: 6.03 K/UL — SIGNIFICANT CHANGE UP (ref 4.8–10.8)
WBC # FLD AUTO: 6.03 K/UL — SIGNIFICANT CHANGE UP (ref 4.8–10.8)

## 2021-04-30 PROCEDURE — 99220: CPT

## 2021-04-30 RX ORDER — ACETAMINOPHEN 500 MG
650 TABLET ORAL ONCE
Refills: 0 | Status: COMPLETED | OUTPATIENT
Start: 2021-04-30 | End: 2021-04-30

## 2021-04-30 RX ADMIN — Medication 650 MILLIGRAM(S): at 20:13

## 2021-04-30 NOTE — ED ADULT NURSE NOTE - OBJECTIVE STATEMENT
Patient presents to ED with c/o right  ankle varicose vein rupture, and sever bleeding. Denies any trauma to leg, denies any falls, denies any SOB, denies dizziness. Patient's daughter at bedside states patient had similar episode on left leg a week ago. patient

## 2021-04-30 NOTE — ED CDU PROVIDER INITIAL DAY NOTE - PROGRESS NOTE DETAILS
received signout from Dr. Hilario/Dr. Potts - pt placed in obs to transfusion of blood; pt with hx of chronic anemia baseline 7-8 today 5; pt currently on eliquis and had bleeding lower ext varicosities; seen by tisha earlier and figure 8 sutured and pt to f/u with Dr. mohr on monday; pt admit to weakness, fatigue; denies melena or brpbr;

## 2021-04-30 NOTE — ED PROVIDER NOTE - PHYSICAL EXAMINATION
CONSTITUTIONAL: Well-developed; well-nourished; in no acute distress.   SKIN: warm, dry. small knick to the right medial malleolus; no longer bleeding.   HEAD: Normocephalic; atraumatic.  EYES: PERRL, EOMI, no conjunctival erythema  ENT: No nasal discharge; airway clear.  NECK: Supple; non tender.  CARD: DP pulses 2+ bilaterally.   EXT: Normal ROM.  No clubbing, cyanosis or edema.   NEURO: Alert, oriented, grossly unremarkable.  PSYCH: Cooperative, appropriate.

## 2021-04-30 NOTE — CONSULT NOTE ADULT - SUBJECTIVE AND OBJECTIVE BOX
HPI:   This is an 85 year old female with a PMH/PSH of HTN, AFib on Eliquis, hypothyroidism, CKD, AAA s/p EVAR (2017, Dr. Kraft) who presented to the ED with spontaneous bleeding from the RLE. Patient states she has a history of bleeding ulcers from the LLE for which she has 2 recent trips to [facility], where the bleeding sites were sutured closed and patient was discharged each time. Patient states that she had an appointment with Dr. Lima yesterday in clinic where he removed the suture from the LLE and placed kerlix and ACE wraps over bilateral lower extremities. Patient and daughter at bedside states that patient was napping this afternoon when the RLE started spontaneously bleeding and would not stop, prompting a call to Dr. Lima's office and a visit to the ED. Patient notes pain to the RLE over the ankle, however denies other symptoms.    ROS: 10-system review is otherwise negative except HPI above.      PAST MEDICAL & SURGICAL HISTORY:  Heart failure  Stage 4 chronic kidney disease  Hypertension  Arthritis, gouty    ALLERGIES: latex (Other)  Mushrooms (Unknown)  penicillins (Other)  ragweed pollen allergen extract (Other)  shellfish (Unknown)    HOME MEDICATIONS:  ***    CURRENT MEDICATIONS  MEDICATIONS (STANDING):   MEDICATIONS (PRN):  --------------------------------------------------------------------------------------------    Vitals:   T(C): 36.9 (04-30-21 @ 18:10), Max: 36.9 (04-30-21 @ 18:10)  HR: 69 (04-30-21 @ 18:10) (69 - 69)  BP: 112/68 (04-30-21 @ 18:10) (112/68 - 112/68)  RR: 17 (04-30-21 @ 18:10) (17 - 17)  SpO2: 98% (04-30-21 @ 18:10) (98% - 98%)    Height (cm): 162.6 (04-30 @ 18:10)  Weight (kg): 63.5 (04-24 @ 21:59)  BMI (kg/m2): 24 (04-30 @ 18:10)  BSA (m2): 1.68 (04-30 @ 18:10)    PHYSICAL EXAM:   General: Well developed female in mild acute distress secondary to RLE pain. AAOx3.  Cardio: RRR, nml S1/S2  Resp: Good effort, CTA b/l  GI/Abd: Soft, NT/ND, no rebound/guarding.  Vascular: Extremities are normal in size, color and temperature.  Musculoskeletal: All 4 extremities moving spontaneously, no limitations.   Wound: LLE with kerlix and ACE bandage. RLE bandage removed, small linear skin break noted to the medial aspect of the right ankle, no active bleeding.   --------------------------------------------------------------------------------------------  LABS:  Pending    --------------------------------------------------------------------------------------------  IMAGING  None

## 2021-04-30 NOTE — CONSULT NOTE ADULT - ATTENDING COMMENTS
85 year old with bleeding varix    needs stitch for hemostasis  continue local wound care  will follow as outpatient

## 2021-04-30 NOTE — ED CDU PROVIDER INITIAL DAY NOTE - ATTENDING CONTRIBUTION TO CARE
Pt presented with bleeding varicosity. Treated by vascular and achieved hemostasis. Found to be anemic and transfused. Pt has wound to the lower extremity and wears ALDO boot. Under the care of a vascular surgeon.

## 2021-04-30 NOTE — CONSULT NOTE ADULT - ASSESSMENT
ASSESSMENT: Patient is a 84yo F with a PMH/PSH of HTN, AFib on Eliquis, hypothyroidism, CKD, AAA s/p EVAR (2017, Dr. Kraft) who presented to the ED with spontaneous bleeding from the RLE    PLAN:   - Bleeding vessel sutured with 4-0 prolene  - Pressure dressing applied, please keep legs wrapped and elevated (above level of heart) as much as possible  - Hold PM dose of Eliquis, may resume tomorrow  - F/u in office with Dr. Lima on Monday, 5/3    - Patient seen/examined  with Fellow, Dr. Unger  - Plan discussed with Attending, Dr. Lima

## 2021-04-30 NOTE — ED CDU PROVIDER INITIAL DAY NOTE - OBJECTIVE STATEMENT
85 yold female to Ed Pmhx Htn, Hld, afib on eliquis, CHF, Hypothyroidism; s/p recent admission to hospital for c-dif, gurpreet, GI bleed 4/9 - 4/18; pt refused colonoscopy; pt with multiple visits to ED for bleeding varicose veins - controlled with pressure and sutured today; H/H checked today and pt found to 5/15; pt consented for blood and placed in obs for transfusion;

## 2021-04-30 NOTE — ED PROVIDER NOTE - OBJECTIVE STATEMENT
Patient is an 86 yo F w/ hx ofHLD, HTN, A-fib on Eliquis, hypothyroidism, CHF, AAA s/p EVAR (2017) presents to the ED with spontaneous bleeding vessel to RLE. Patient has had similar bleeding vessels recently to the left leg that requiring suturing in ED; followed up with Dr. Lima yesterday. Today, had spontaneous bleeding from the RLE. Daughter called on call partner of Dr. Lima's who referred patient to the ED.

## 2021-04-30 NOTE — ED CDU PROVIDER INITIAL DAY NOTE - PHYSICAL EXAMINATION
Constitutional: elderly female; pale in no acute distress  Head: Normocephalic, atraumatic.  Eyes: pale conjunctiva;  ENT: No nasal discharge. Mucous membranes dry.  Neck: Supple. Painless ROM.  Cardiovascular:  Regular rate and rhythm.   Pulmonary: Lungs clear to auscultation bilaterally.  Abdominal: Soft. Nondistended. No rebound, guarding, rigidity.  Extremities. Pelvis stable. lower ext dressing noted s/p sutures to controlled bleeding;  Skin: No rashes, cyanosis.   Neuro: AAOx3. No focal neurological deficits.  Psych: Normal mood. Normal affect.

## 2021-04-30 NOTE — ED PROVIDER NOTE - PROGRESS NOTE DETAILS
DC: [facility] consulted. will evaluate. DC: Jonick sutured [facility] fellow [facility] North. Recs: hold eliquis tonight. resume tomorrow. elevate leg. follow up with Dr. Lima on Monday.     However, hemoglobin 5. Patient had multiple episodes of profuse bleeding from lega over the course of the last week. Denies melena, BRBPR.

## 2021-04-30 NOTE — ED PROVIDER NOTE - NS ED ROS FT
Constitutional: No fevers.   Eyes:  No visual changes, eye pain or discharge.  ENMT:  No sore throat or runny nose.  Cardiac:  No chest pain, SOB or edema.   Respiratory:  No cough or respiratory distress. No hemoptysis. No history of asthma or RAD.  GI:  No nausea, vomiting, diarrhea or abdominal pain.  :  No dysuria, frequency or burning.  MS:  No myalgia, muscle weakness, joint pain or back pain.  Neuro:  No headache or weakness.  No LOC.  Skin:  No skin rash.   Endocrine: No history of thyroid disease or diabetes. Constitutional: No fevers.   Eyes:  No visual changes, eye pain or discharge.  ENMT:  No sore throat or runny nose.  Cardiac:  No chest pain, SOB or edema.   Respiratory:  No cough or respiratory distress. No hemoptysis.  GI:  No nausea, vomiting, diarrhea or abdominal pain.  :  No dysuria, frequency or burning.  MS:  +leg bleeding.   Neuro:  No headache or weakness.  No LOC.  Skin:  No skin rash.   Endocrine: hx of hypothyroidism.

## 2021-04-30 NOTE — ED PROVIDER NOTE - CLINICAL SUMMARY MEDICAL DECISION MAKING FREE TEXT BOX
84 yo F presented to ED for LE bleeding which stopped prior to arrival. Patient was found to have a HGB of 5 and she was placed in observation for 3 units of blood.

## 2021-05-01 VITALS
SYSTOLIC BLOOD PRESSURE: 143 MMHG | OXYGEN SATURATION: 97 % | DIASTOLIC BLOOD PRESSURE: 67 MMHG | RESPIRATION RATE: 18 BRPM | HEART RATE: 75 BPM | TEMPERATURE: 98 F

## 2021-05-01 LAB
ALBUMIN SERPL ELPH-MCNC: 2.9 G/DL — LOW (ref 3.5–5.2)
ALP SERPL-CCNC: 129 U/L — HIGH (ref 30–115)
ALT FLD-CCNC: 12 U/L — SIGNIFICANT CHANGE UP (ref 0–41)
ANION GAP SERPL CALC-SCNC: 11 MMOL/L — SIGNIFICANT CHANGE UP (ref 7–14)
APTT BLD: 33.2 SEC — SIGNIFICANT CHANGE UP (ref 27–39.2)
AST SERPL-CCNC: 19 U/L — SIGNIFICANT CHANGE UP (ref 0–41)
BILIRUB SERPL-MCNC: 0.9 MG/DL — SIGNIFICANT CHANGE UP (ref 0.2–1.2)
BLD GP AB SCN SERPL QL: SIGNIFICANT CHANGE UP
BUN SERPL-MCNC: 28 MG/DL — HIGH (ref 10–20)
CALCIUM SERPL-MCNC: 8.4 MG/DL — LOW (ref 8.5–10.1)
CHLORIDE SERPL-SCNC: 109 MMOL/L — SIGNIFICANT CHANGE UP (ref 98–110)
CO2 SERPL-SCNC: 24 MMOL/L — SIGNIFICANT CHANGE UP (ref 17–32)
CREAT SERPL-MCNC: 1.6 MG/DL — HIGH (ref 0.7–1.5)
GLUCOSE SERPL-MCNC: 98 MG/DL — SIGNIFICANT CHANGE UP (ref 70–99)
INR BLD: 2.14 RATIO — HIGH (ref 0.65–1.3)
POTASSIUM SERPL-MCNC: 3.4 MMOL/L — LOW (ref 3.5–5)
POTASSIUM SERPL-SCNC: 3.4 MMOL/L — LOW (ref 3.5–5)
PROT SERPL-MCNC: 4.8 G/DL — LOW (ref 6–8)
PROTHROM AB SERPL-ACNC: 24.6 SEC — HIGH (ref 9.95–12.87)
SODIUM SERPL-SCNC: 144 MMOL/L — SIGNIFICANT CHANGE UP (ref 135–146)

## 2021-05-01 PROCEDURE — 99212 OFFICE O/P EST SF 10 MIN: CPT

## 2021-05-01 PROCEDURE — 99217: CPT

## 2021-05-01 RX ORDER — FUROSEMIDE 40 MG
20 TABLET ORAL DAILY
Refills: 0 | Status: COMPLETED | OUTPATIENT
Start: 2021-05-01 | End: 2021-05-01

## 2021-05-01 RX ORDER — ACETAMINOPHEN 500 MG
650 TABLET ORAL ONCE
Refills: 0 | Status: COMPLETED | OUTPATIENT
Start: 2021-05-01 | End: 2021-05-01

## 2021-05-01 RX ADMIN — Medication 650 MILLIGRAM(S): at 00:09

## 2021-05-01 RX ADMIN — Medication 650 MILLIGRAM(S): at 01:14

## 2021-05-01 RX ADMIN — Medication 650 MILLIGRAM(S): at 01:10

## 2021-05-01 RX ADMIN — Medication 20 MILLIGRAM(S): at 04:12

## 2021-05-01 RX ADMIN — Medication 20 MILLIGRAM(S): at 06:00

## 2021-05-01 NOTE — ED CDU PROVIDER DISPOSITION NOTE - PATIENT PORTAL LINK FT
You can access the FollowMyHealth Patient Portal offered by [facility] by registering at the following website: [email or web address]. By joining [employer]’s FollowMyHealth portal, you will also be able to view your health information using other applications (apps) compatible with our system.

## 2021-05-01 NOTE — ED CDU PROVIDER DISPOSITION NOTE - PROVIDER TOKENS
PROVIDER:[TOKEN:[[id number]],FOLLOWUP:[4-6 Days]],PROVIDER:[TOKEN:[[id number]],FOLLOWUP:[7-10 Days]]

## 2021-05-01 NOTE — ED CDU PROVIDER SUBSEQUENT DAY NOTE - PROGRESS NOTE DETAILS
Pt seen at bedside, NAD. Arrived overnight, received from TOMÁS Kay. Pt presenting under transfusion protocol. Hgb found to be 5 in the ED. Pt has h/o GIB  with recent admit 4/9-4/18 and multiple visits for bleeding varicose veins. Vascular - Dr Lima, Cardiology - Dr Garcia. Pt received 3 units PRBC.

## 2021-05-01 NOTE — ED CDU PROVIDER SUBSEQUENT DAY NOTE - MEDICAL DECISION MAKING DETAILS
Blood transfusion of three units completed. NO sign of further bleeding. Dressings intact. Pt to resume all meds.

## 2021-05-01 NOTE — ED CDU PROVIDER DISPOSITION NOTE - CLINICAL COURSE
Pt presents with bleeding varicosities. Treated by vascular surgery. HgB found to be 5. Transfused 3 units of blood. Tolerated. well. Wounds of leg is being managed by vascular surgery.

## 2021-05-01 NOTE — ED CDU PROVIDER DISPOSITION NOTE - CARE PROVIDERS DIRECT ADDRESSES
,DirectAddress_Unknown,[email or web address]

## 2021-05-01 NOTE — ED CDU PROVIDER DISPOSITION NOTE - CARE PROVIDER_API CALL
[place] [address]  Phone: [phone]  Fax: [phone]  Follow Up Time: 4-6 Days    Abdi Lima)  Surgery; Vascular Surgery  [address] [place] [address]  Phone: [phone]  Fax: [phone]  Follow Up Time: 7-10 Days

## 2021-05-01 NOTE — ED ADULT NURSE REASSESSMENT NOTE - NS ED NURSE REASSESS COMMENT FT1
Third unit up and infusing well. No reactions, patient tolerated well, family remains at bedside.  No complaints of pain
Patient to be transfused, daughters at bedside

## 2021-05-01 NOTE — ED CDU PROVIDER DISPOSITION NOTE - ATTENDING CONTRIBUTION TO CARE
Hx , exam and dispo. Daughter enquired about pain meds. Only safe option is Tylenol. Risks of opioids high due to hx of constipation and previous hx of nausea and vomiting when she used tyl #3.

## 2021-05-01 NOTE — ED CDU PROVIDER DISPOSITION NOTE - NSFOLLOWUPINSTRUCTIONS_ED_ALL_ED_FT
Follow up with your PMD - Dr Barber in 1 week  Follow up with your Vascular  - Dr Lima  Continue all your home medications  Return to the ED if your symptoms worsen/return

## 2021-05-06 ENCOUNTER — APPOINTMENT (OUTPATIENT)
Dept: VASCULAR SURGERY | Facility: CLINIC | Age: 86
End: 2021-05-06
Payer: MEDICARE

## 2021-05-06 DIAGNOSIS — L03.116 CELLULITIS OF LEFT LOWER LIMB: ICD-10-CM

## 2021-05-06 PROCEDURE — 99072 ADDL SUPL MATRL&STAF TM PHE: CPT

## 2021-05-06 PROCEDURE — 29580 STRAPPING UNNA BOOT: CPT | Mod: RT

## 2021-05-06 RX ORDER — COLLAGENASE SANTYL 250 [ARB'U]/G
250 OINTMENT TOPICAL DAILY
Qty: 1 | Refills: 2 | Status: ACTIVE | COMMUNITY
Start: 2021-05-06 | End: 1900-01-01

## 2021-05-06 RX ORDER — LEVOFLOXACIN 500 MG/1
500 TABLET, FILM COATED ORAL DAILY
Qty: 10 | Refills: 0 | Status: ACTIVE | COMMUNITY
Start: 2021-05-06 | End: 1900-01-01

## 2021-05-06 NOTE — ASSESSMENT
[FreeTextEntry1] : Ms. 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 \par \par She has bilateral lower extremity venous ulcerations and cellulitis to left lower extremity. I am starting her on Levaquin and sending her to Dr. Sheehan for wound care and possible wound debridement on left lower extremity. I am also prescribing her Santyl for left leg wound care. I am applying unna boot to right leg and she will follow up in one week.

## 2021-05-06 NOTE — HISTORY OF PRESENT ILLNESS
[FreeTextEntry1] : Ms. 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 \par \par She presents for follow up, was seen in ED 2 weeks for bleeding varix and had suture placed in the LLE that was removed last week. and then again was taken to ED for bleeding varix in RLE ad had suture placed, was also anemic and received 3 units of blood transfusion. .

## 2021-05-13 ENCOUNTER — APPOINTMENT (OUTPATIENT)
Dept: BURN CARE | Facility: CLINIC | Age: 86
End: 2021-05-13
Payer: MEDICARE

## 2021-05-13 ENCOUNTER — APPOINTMENT (OUTPATIENT)
Dept: VASCULAR SURGERY | Facility: CLINIC | Age: 86
End: 2021-05-13
Payer: MEDICARE

## 2021-05-13 ENCOUNTER — OUTPATIENT (OUTPATIENT)
Dept: OUTPATIENT SERVICES | Facility: HOSPITAL | Age: 86
LOS: 1 days | Discharge: HOME | End: 2021-05-13

## 2021-05-13 PROCEDURE — 99202 OFFICE O/P NEW SF 15 MIN: CPT

## 2021-05-13 PROCEDURE — 29580 STRAPPING UNNA BOOT: CPT | Mod: 50

## 2021-05-13 PROCEDURE — 97597 DBRDMT OPN WND 1ST 20 CM/<: CPT

## 2021-05-13 PROCEDURE — 99072 ADDL SUPL MATRL&STAF TM PHE: CPT

## 2021-05-13 NOTE — ASSESSMENT
[FreeTextEntry1] : Ms. 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 \par \par She has bilateral lower extremity venous ulcerations and  history of a bleeding varix in which required 3 units of blood transfusion. The patient recently had a suture ligation of the bleeding varix today remove the sutures. I recommend to therapy and next week I will schedule the patient for a venous duplex her reflux to evaluate her saphenous vein for incompetency. She may benefit from greater saphenous vein ablation.

## 2021-05-13 NOTE — HISTORY OF PRESENT ILLNESS
[FreeTextEntry1] : Ms. 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 \par \par She has bilateral lower extremity venous ulcerations and cellulitis to left lower extremity. She was started on Levaquin and sent to Dr. Sheehan for wound care and possible wound debridement on left lower extremity. Dr. Sheehan provided  wound cultures and the patient will follow up with him in a week.

## 2021-05-15 LAB
BACTERIA SPEC CULT: ABNORMAL
BACTERIA SPEC CULT: NORMAL

## 2021-05-20 ENCOUNTER — OUTPATIENT (OUTPATIENT)
Dept: OUTPATIENT SERVICES | Facility: HOSPITAL | Age: 86
LOS: 1 days | Discharge: HOME | End: 2021-05-20

## 2021-05-20 ENCOUNTER — APPOINTMENT (OUTPATIENT)
Dept: BURN CARE | Facility: CLINIC | Age: 86
End: 2021-05-20
Payer: MEDICARE

## 2021-05-20 ENCOUNTER — APPOINTMENT (OUTPATIENT)
Dept: VASCULAR SURGERY | Facility: CLINIC | Age: 86
End: 2021-05-20
Payer: MEDICARE

## 2021-05-20 VITALS
WEIGHT: 140 LBS | HEIGHT: 63 IN | TEMPERATURE: 97.3 F | SYSTOLIC BLOOD PRESSURE: 148 MMHG | BODY MASS INDEX: 24.8 KG/M2 | DIASTOLIC BLOOD PRESSURE: 68 MMHG

## 2021-05-20 DIAGNOSIS — I87.2 VENOUS INSUFFICIENCY (CHRONIC) (PERIPHERAL): ICD-10-CM

## 2021-05-20 PROCEDURE — 29580 STRAPPING UNNA BOOT: CPT | Mod: 50

## 2021-05-20 PROCEDURE — 99213 OFFICE O/P EST LOW 20 MIN: CPT | Mod: 25

## 2021-05-20 PROCEDURE — 97597 DBRDMT OPN WND 1ST 20 CM/<: CPT

## 2021-05-20 PROCEDURE — 93970 EXTREMITY STUDY: CPT

## 2021-05-20 PROCEDURE — 99072 ADDL SUPL MATRL&STAF TM PHE: CPT

## 2021-05-20 NOTE — HISTORY OF PRESENT ILLNESS
[FreeTextEntry1] : Ms. 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 \par \par She has bilateral lower extremity venous ulcerations and cellulitis to left lower extremity. She has had episodes of bleeding from the ulceration. She saw Dr. Sheehan who suggested continued local wound care. The recent cultures were negative. She returns for follow up.

## 2021-05-20 NOTE — DATA REVIEWED
[FreeTextEntry1] : Venous duplex shows no DVT with multiple varicosities in the bilateral lower extremities. The GSV has been ligated bilaterally.

## 2021-05-20 NOTE — ASSESSMENT
[FreeTextEntry1] : Ms. 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 \par \par The duplex showed multiple varicosities. She is a candidate for stab phlebectomy. I discussed the operation with the family who will consider the procedure. I will discuss with Dr. Barber regarding her medical optimization for procedures. She will follow up in 2 weeks for wound care.

## 2021-05-22 LAB — BACTERIA SPEC CULT: ABNORMAL

## 2021-06-01 ENCOUNTER — OUTPATIENT (OUTPATIENT)
Dept: OUTPATIENT SERVICES | Facility: HOSPITAL | Age: 86
LOS: 1 days | Discharge: HOME | End: 2021-06-01

## 2021-06-01 ENCOUNTER — APPOINTMENT (OUTPATIENT)
Dept: VASCULAR SURGERY | Facility: CLINIC | Age: 86
End: 2021-06-01
Payer: MEDICARE

## 2021-06-01 ENCOUNTER — APPOINTMENT (OUTPATIENT)
Dept: BURN CARE | Facility: CLINIC | Age: 86
End: 2021-06-01
Payer: MEDICARE

## 2021-06-01 DIAGNOSIS — I83.029 VARICOSE VEINS OF LEFT LOWER EXTREMITY WITH ULCER OF UNSPECIFIED SITE: ICD-10-CM
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
## 2021-06-01 DIAGNOSIS — L97.919 NON-PRESSURE CHRONIC ULCER OF UNSPECIFIED PART OF RIGHT LOWER LEG WITH UNSPECIFIED SEVERITY: ICD-10-CM

## 2021-06-01 DIAGNOSIS — T14.90XA INJURY, UNSPECIFIED, INITIAL ENCOUNTER: ICD-10-CM

## 2021-06-01 DIAGNOSIS — I83.019 VARICOSE VEINS OF RIGHT LOWER EXTREMITY WITH ULCER OF UNSPECIFIED SITE: ICD-10-CM
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
## 2021-06-01 PROCEDURE — 99213 OFFICE O/P EST LOW 20 MIN: CPT | Mod: 25

## 2021-06-01 PROCEDURE — 99213 OFFICE O/P EST LOW 20 MIN: CPT

## 2021-06-01 PROCEDURE — 99072 ADDL SUPL MATRL&STAF TM PHE: CPT

## 2021-06-01 RX ORDER — CLINDAMYCIN HYDROCHLORIDE 150 MG/1
150 CAPSULE ORAL EVERY 8 HOURS
Qty: 15 | Refills: 0 | Status: ACTIVE | COMMUNITY
Start: 2021-06-01 | End: 1900-01-01

## 2021-06-01 NOTE — ASSESSMENT
[FreeTextEntry1] : Ms. 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 \par \par The duplex showed multiple varicosities. She is a candidate for stab phlebectomy. I recommend conservative management with daily wound care and compression therapy as the wounds are healing well and considering her comorbidities. I will see her back in 2 weeks time.

## 2021-06-01 NOTE — HISTORY OF PRESENT ILLNESS
[FreeTextEntry1] : Ms. 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 \par \par She has bilateral lower extremity venous ulcerations and cellulitis to left lower extremity. She has had episodes of bleeding from the ulceration. She saw Dr. Sheehan who suggested continued local wound care with Santyl.

## 2021-06-06 LAB — BACTERIA SPEC CULT: ABNORMAL

## 2021-06-15 ENCOUNTER — APPOINTMENT (OUTPATIENT)
Dept: VASCULAR SURGERY | Facility: CLINIC | Age: 86
End: 2021-06-15
Payer: MEDICARE

## 2021-06-15 ENCOUNTER — APPOINTMENT (OUTPATIENT)
Dept: BURN CARE | Facility: CLINIC | Age: 86
End: 2021-06-15
Payer: MEDICARE

## 2021-06-15 ENCOUNTER — OUTPATIENT (OUTPATIENT)
Dept: OUTPATIENT SERVICES | Facility: HOSPITAL | Age: 86
LOS: 1 days | Discharge: HOME | End: 2021-06-15

## 2021-06-15 DIAGNOSIS — L97.929 NON-PRESSURE CHRONIC ULCER OF UNSPECIFIED PART OF LEFT LOWER LEG WITH UNSPECIFIED SEVERITY: ICD-10-CM

## 2021-06-15 DIAGNOSIS — I83.029 VARICOSE VEINS OF LEFT LOWER EXTREMITY WITH ULCER OF UNSPECIFIED SITE: ICD-10-CM

## 2021-06-15 DIAGNOSIS — I83.019 VARICOSE VEINS OF RIGHT LOWER EXTREMITY WITH ULCER OF UNSPECIFIED SITE: ICD-10-CM

## 2021-06-15 DIAGNOSIS — L97.919 NON-PRESSURE CHRONIC ULCER OF UNSPECIFIED PART OF RIGHT LOWER LEG WITH UNSPECIFIED SEVERITY: ICD-10-CM

## 2021-06-15 PROCEDURE — 99213 OFFICE O/P EST LOW 20 MIN: CPT

## 2021-06-15 PROCEDURE — 99072 ADDL SUPL MATRL&STAF TM PHE: CPT

## 2021-06-15 NOTE — ASSESSMENT
[FreeTextEntry1] : Ms. 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 \par \par The duplex showed multiple varicosities. She is a candidate for stab phlebectomy. I recommend conservative management with daily wound care and compression therapy as the wounds are healing well and considering her comorbidities. I will see her back in 4 weeks time.

## 2021-07-15 ENCOUNTER — APPOINTMENT (OUTPATIENT)
Dept: BURN CARE | Facility: CLINIC | Age: 86
End: 2021-07-15
Payer: MEDICARE

## 2021-07-15 ENCOUNTER — APPOINTMENT (OUTPATIENT)
Dept: VASCULAR SURGERY | Facility: CLINIC | Age: 86
End: 2021-07-15
Payer: MEDICARE

## 2021-07-15 ENCOUNTER — OUTPATIENT (OUTPATIENT)
Dept: OUTPATIENT SERVICES | Facility: HOSPITAL | Age: 86
LOS: 1 days | Discharge: HOME | End: 2021-07-15

## 2021-07-15 ENCOUNTER — APPOINTMENT (OUTPATIENT)
Dept: VASCULAR SURGERY | Facility: CLINIC | Age: 86
End: 2021-07-15

## 2021-07-15 PROCEDURE — 99072 ADDL SUPL MATRL&STAF TM PHE: CPT

## 2021-07-15 PROCEDURE — 99213 OFFICE O/P EST LOW 20 MIN: CPT

## 2021-07-15 PROCEDURE — 99214 OFFICE O/P EST MOD 30 MIN: CPT

## 2021-07-15 NOTE — ASSESSMENT
[FreeTextEntry1] : Ms. 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 Kraft.\par \par The venous duplex showed multiple varicosities. She is a candidate for stab phlebectomy but I recommend conservative management with daily wound care with Santyl and hydrocortisone to the irritated area around the wound and compression therapy. I will see her back in 4 weeks time.

## 2021-07-15 NOTE — ASSESSMENT
[FreeTextEntry1] : Ms. 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 Kraft.\par \par The venous duplex showed multiple varicosities. She is a candidate for stab phlebectomy but I recommend conservative management with daily wound care with Santyl and hydrocortisone to the irritated area around the wound and compression therapy. I will see her back in 3 weeks time.

## 2021-07-15 NOTE — HISTORY OF PRESENT ILLNESS
[FreeTextEntry1] : Ms. 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 \par \par She had bleeding varices in the lower extremities bilaterally that has resolved. She has bilateral lower extremity venous ulcerations. She saw Dr. Sheehan who suggested continued local wound care with Nba.

## 2021-07-17 LAB — BACTERIA SPEC CULT: ABNORMAL

## 2021-07-28 DIAGNOSIS — T14.90XA INJURY, UNSPECIFIED, INITIAL ENCOUNTER: ICD-10-CM

## 2021-07-28 DIAGNOSIS — L97.919 NON-PRESSURE CHRONIC ULCER OF UNSPECIFIED PART OF RIGHT LOWER LEG WITH UNSPECIFIED SEVERITY: ICD-10-CM

## 2021-07-28 DIAGNOSIS — I83.019 VARICOSE VEINS OF RIGHT LOWER EXTREMITY WITH ULCER OF UNSPECIFIED SITE: ICD-10-CM

## 2021-07-28 DIAGNOSIS — I83.029 VARICOSE VEINS OF LEFT LOWER EXTREMITY WITH ULCER OF UNSPECIFIED SITE: ICD-10-CM

## 2021-07-30 ENCOUNTER — APPOINTMENT (OUTPATIENT)
Dept: UROGYNECOLOGY | Facility: CLINIC | Age: 86
End: 2021-07-30

## 2021-08-03 ENCOUNTER — APPOINTMENT (OUTPATIENT)
Dept: BURN CARE | Facility: CLINIC | Age: 86
End: 2021-08-03
Payer: MEDICARE

## 2021-08-03 ENCOUNTER — APPOINTMENT (OUTPATIENT)
Dept: VASCULAR SURGERY | Facility: CLINIC | Age: 86
End: 2021-08-03
Payer: MEDICARE

## 2021-08-03 ENCOUNTER — OUTPATIENT (OUTPATIENT)
Dept: OUTPATIENT SERVICES | Facility: HOSPITAL | Age: 86
LOS: 1 days | Discharge: HOME | End: 2021-08-03

## 2021-08-03 VITALS
HEART RATE: 71 BPM | WEIGHT: 138 LBS | DIASTOLIC BLOOD PRESSURE: 61 MMHG | HEIGHT: 63 IN | TEMPERATURE: 97 F | SYSTOLIC BLOOD PRESSURE: 137 MMHG | BODY MASS INDEX: 24.45 KG/M2

## 2021-08-03 DIAGNOSIS — I83.029 VARICOSE VEINS OF RIGHT LOWER EXTREMITY WITH ULCER OF UNSPECIFIED SITE: ICD-10-CM

## 2021-08-03 DIAGNOSIS — I83.019 VARICOSE VEINS OF RIGHT LOWER EXTREMITY WITH ULCER OF UNSPECIFIED SITE: ICD-10-CM

## 2021-08-03 DIAGNOSIS — L97.919 VARICOSE VEINS OF RIGHT LOWER EXTREMITY WITH ULCER OF UNSPECIFIED SITE: ICD-10-CM

## 2021-08-03 DIAGNOSIS — L97.929 VARICOSE VEINS OF RIGHT LOWER EXTREMITY WITH ULCER OF UNSPECIFIED SITE: ICD-10-CM

## 2021-08-03 PROCEDURE — 99213 OFFICE O/P EST LOW 20 MIN: CPT | Mod: 25

## 2021-08-03 PROCEDURE — 99213 OFFICE O/P EST LOW 20 MIN: CPT

## 2021-08-03 PROCEDURE — 97597 DBRDMT OPN WND 1ST 20 CM/<: CPT

## 2021-08-03 NOTE — HISTORY OF PRESENT ILLNESS
[FreeTextEntry1] : Ms. 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 \par \par She had bleeding varices in the lower extremities bilaterally that has resolved. She has bilateral lower extremity venous ulcerations. She saw Dr. Sheehan who suggested continued local wound care with Santyl. The patient's wounds are overall improvement on the left leg. Right leg has resolved

## 2021-08-10 DIAGNOSIS — L97.929 NON-PRESSURE CHRONIC ULCER OF UNSPECIFIED PART OF LEFT LOWER LEG WITH UNSPECIFIED SEVERITY: ICD-10-CM

## 2021-08-10 DIAGNOSIS — L97.919 NON-PRESSURE CHRONIC ULCER OF UNSPECIFIED PART OF RIGHT LOWER LEG WITH UNSPECIFIED SEVERITY: ICD-10-CM

## 2021-08-10 DIAGNOSIS — I83.019 VARICOSE VEINS OF RIGHT LOWER EXTREMITY WITH ULCER OF UNSPECIFIED SITE: ICD-10-CM

## 2021-08-10 DIAGNOSIS — I83.029 VARICOSE VEINS OF LEFT LOWER EXTREMITY WITH ULCER OF UNSPECIFIED SITE: ICD-10-CM

## 2021-08-31 ENCOUNTER — APPOINTMENT (OUTPATIENT)
Dept: VASCULAR SURGERY | Facility: CLINIC | Age: 86
End: 2021-08-31
Payer: MEDICARE

## 2021-08-31 ENCOUNTER — APPOINTMENT (OUTPATIENT)
Dept: BURN CARE | Facility: CLINIC | Age: 86
End: 2021-08-31
Payer: MEDICARE

## 2021-08-31 ENCOUNTER — OUTPATIENT (OUTPATIENT)
Dept: OUTPATIENT SERVICES | Facility: HOSPITAL | Age: 86
LOS: 1 days | Discharge: HOME | End: 2021-08-31

## 2021-08-31 PROCEDURE — 99214 OFFICE O/P EST MOD 30 MIN: CPT

## 2021-08-31 PROCEDURE — 99213 OFFICE O/P EST LOW 20 MIN: CPT

## 2021-08-31 RX ORDER — HYDROCORTISONE 25 MG/G
2.5 CREAM TOPICAL TWICE DAILY
Qty: 1 | Refills: 0 | Status: ACTIVE | COMMUNITY
Start: 2021-08-31 | End: 1900-01-01

## 2021-08-31 NOTE — ASSESSMENT
[FreeTextEntry1] : Ms. 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 Kraft.\par \par The right leg wound is healed, left leg ulcer is healing well. I recommend daily wound care. I will see her back in 4 weeks time. I will obtain an aortic duplex to evaluate the EVAR t the next visit.

## 2021-08-31 NOTE — HISTORY OF PRESENT ILLNESS
[FreeTextEntry1] : Ms. 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 \par \par She had bleeding varices in the lower extremities bilaterally that has resolved. She has bilateral lower extremity venous ulcerations. She saw Dr. Sheehan who suggested continued local wound care with Santyl. Right leg wound is healed and left leg is healing.

## 2021-09-02 ENCOUNTER — APPOINTMENT (OUTPATIENT)
Dept: HEMATOLOGY ONCOLOGY | Facility: CLINIC | Age: 86
End: 2021-09-02
Payer: MEDICARE

## 2021-09-02 ENCOUNTER — LABORATORY RESULT (OUTPATIENT)
Age: 86
End: 2021-09-02

## 2021-09-02 VITALS
DIASTOLIC BLOOD PRESSURE: 62 MMHG | HEIGHT: 63 IN | BODY MASS INDEX: 23.21 KG/M2 | WEIGHT: 131 LBS | SYSTOLIC BLOOD PRESSURE: 128 MMHG | HEART RATE: 70 BPM | RESPIRATION RATE: 16 BRPM | TEMPERATURE: 98.7 F

## 2021-09-02 PROCEDURE — 99214 OFFICE O/P EST MOD 30 MIN: CPT

## 2021-09-02 PROCEDURE — 88189 FLOWCYTOMETRY/READ 16 & >: CPT

## 2021-09-03 LAB
ALBUMIN SERPL ELPH-MCNC: 3.9 G/DL
ALP BLD-CCNC: 155 U/L
ALT SERPL-CCNC: 10 U/L
ANION GAP SERPL CALC-SCNC: 17 MMOL/L
AST SERPL-CCNC: 20 U/L
BILIRUB SERPL-MCNC: 0.5 MG/DL
BUN SERPL-MCNC: 27 MG/DL
CALCIUM SERPL-MCNC: 9.9 MG/DL
CHLORIDE SERPL-SCNC: 101 MMOL/L
CO2 SERPL-SCNC: 25 MMOL/L
CREAT SERPL-MCNC: 1.4 MG/DL
DEPRECATED KAPPA LC FREE/LAMBDA SER: 0.84 RATIO
FERRITIN SERPL-MCNC: 93 NG/ML
GLUCOSE SERPL-MCNC: 82 MG/DL
HCT VFR BLD CALC: 32.4 %
HGB BLD-MCNC: 10.3 G/DL
KAPPA LC CSF-MCNC: 6.12 MG/DL
KAPPA LC SERPL-MCNC: 5.15 MG/DL
LDH SERPL-CCNC: 308
MCHC RBC-ENTMCNC: 30.1 PG
MCHC RBC-ENTMCNC: 31.8 G/DL
MCV RBC AUTO: 94.7 FL
PLATELET # BLD AUTO: 243 K/UL
PMV BLD: 10.8 FL
POTASSIUM SERPL-SCNC: 3.6 MMOL/L
PROT SERPL-MCNC: 6.7 G/DL
RBC # BLD: 3.42 M/UL
RBC # FLD: 15.8 %
RETICS # AUTO: 1.5 %
RETICS AGGREG/RBC NFR: 49.6 K/UL
SODIUM SERPL-SCNC: 143 MMOL/L
VIT B12 SERPL-MCNC: 662 PG/ML
WBC # FLD AUTO: 8.63 K/UL

## 2021-09-05 LAB — EPO SERPL-MCNC: 20.6 MIU/ML

## 2021-09-05 NOTE — PHYSICAL EXAM
[Normal] : grossly intact [de-identified] : slightly pale , hard of hearing in no acute distress.  [de-identified] : grade 2/6 ESM [de-identified] : legs wrapped .  [de-identified] : no organomegaly .

## 2021-09-05 NOTE — HISTORY OF PRESENT ILLNESS
[de-identified] : Ms. ZARINA MARQUES is a 85 year old female here today for evaluation and management of abnormal labwork.\par She was referred by PCP, Dr. Barber.  Zarina is an 86yo F hard of hearing , history obtained from daughter, PMHx of AAA, kidney cancer, hypothyroidism, venous statis ulcers, Afib on eliquis. HTN, \par She is referred for chronic anemia for few years , recurrent episodes of bleeding including from  GI with c diff colitis, , and  bleeding from  varicose veins , lowest Hgb : 5 in April 2021 . refused colonoscopy .  She denies hematochezia. Last colonoscopy in 2018 ( c diff colitis ) . She is on iron and vitamin C since April , last Hgb 9.6 . MCV :101 . normal wbc and platelets .  . \par System review : She complains of generalized weakness , uses a walker to ambulate and requires assistance with personal care , she denies weight loss , fever or night sweats \par \par LAB WORKUP\par (8.24.2021) WBC 7.42, Hgb 9.6, .3, \par (7.22.2021) WBC 6.27, Hgb 9.8, .7, , BUN 28, Cr 1.6, eGFR 29, ALK Phos 129\par (12.13.2019) WBC 7.41, Hgb 14.2, MCV 98.2, \par

## 2021-09-05 NOTE — ASSESSMENT
[FreeTextEntry1] : 85 year old female with anemia likely secondary to GI and varicose vein bleeding +/- CKD .\par Plan : cbc , retic , ferritin , B12 , myeloma panel LDH ,\par         consider iron replacement .  \par

## 2021-09-08 DIAGNOSIS — I83.029 VARICOSE VEINS OF LEFT LOWER EXTREMITY WITH ULCER OF UNSPECIFIED SITE: ICD-10-CM

## 2021-09-08 DIAGNOSIS — L97.929 NON-PRESSURE CHRONIC ULCER OF UNSPECIFIED PART OF LEFT LOWER LEG WITH UNSPECIFIED SEVERITY: ICD-10-CM

## 2021-09-08 DIAGNOSIS — T14.90XA INJURY, UNSPECIFIED, INITIAL ENCOUNTER: ICD-10-CM

## 2021-09-08 DIAGNOSIS — I87.8 OTHER SPECIFIED DISORDERS OF VEINS: ICD-10-CM

## 2021-09-12 LAB — METHYLMALONATE SERPL-SCNC: 736 NMOL/L

## 2021-09-13 RX ORDER — CYANOCOBALAMIN (VITAMIN B-12) 2500 MCG
2500 TABLET, SUBLINGUAL SUBLINGUAL
Qty: 30 | Refills: 5 | Status: ACTIVE | COMMUNITY
Start: 2021-09-13 | End: 1900-01-01

## 2021-09-15 LAB
ALBUMIN MFR SERPL ELPH: 51 %
ALBUMIN SERPL-MCNC: 3.9 G/DL
ALBUMIN/GLOB SERPL: 1.1 RATIO
ALPHA1 GLOB MFR SERPL ELPH: 6 %
ALPHA1 GLOB SERPL ELPH-MCNC: 0.5 G/DL
ALPHA2 GLOB MFR SERPL ELPH: 12.1 %
ALPHA2 GLOB SERPL ELPH-MCNC: 0.9 G/DL
B-GLOBULIN MFR SERPL ELPH: 14 %
B-GLOBULIN SERPL ELPH-MCNC: 1.1 G/DL
GAMMA GLOB FLD ELPH-MCNC: 1.3 G/DL
GAMMA GLOB MFR SERPL ELPH: 16.9 %
INTERPRETATION SERPL IEP-IMP: NORMAL
M PROTEIN MFR SERPL ELPH: NORMAL
MONOCLON BAND OBS SERPL: NORMAL
PROT SERPL-MCNC: 7.6 G/DL
PROT SERPL-MCNC: 7.6 G/DL

## 2021-10-05 ENCOUNTER — OUTPATIENT (OUTPATIENT)
Dept: OUTPATIENT SERVICES | Facility: HOSPITAL | Age: 86
LOS: 1 days | Discharge: HOME | End: 2021-10-05

## 2021-10-05 ENCOUNTER — APPOINTMENT (OUTPATIENT)
Dept: BURN CARE | Facility: CLINIC | Age: 86
End: 2021-10-05
Payer: MEDICARE

## 2021-10-05 ENCOUNTER — APPOINTMENT (OUTPATIENT)
Dept: VASCULAR SURGERY | Facility: CLINIC | Age: 86
End: 2021-10-05
Payer: MEDICARE

## 2021-10-05 VITALS
HEIGHT: 63 IN | WEIGHT: 130 LBS | DIASTOLIC BLOOD PRESSURE: 72 MMHG | TEMPERATURE: 97.6 F | SYSTOLIC BLOOD PRESSURE: 155 MMHG | HEART RATE: 86 BPM | BODY MASS INDEX: 23.04 KG/M2

## 2021-10-05 DIAGNOSIS — I83.029 VARICOSE VEINS OF LEFT LOWER EXTREMITY WITH ULCER OF UNSPECIFIED SITE: ICD-10-CM

## 2021-10-05 DIAGNOSIS — L97.929 VARICOSE VEINS OF LEFT LOWER EXTREMITY WITH ULCER OF UNSPECIFIED SITE: ICD-10-CM

## 2021-10-05 PROCEDURE — 99213 OFFICE O/P EST LOW 20 MIN: CPT

## 2021-10-05 PROCEDURE — 93978 VASCULAR STUDY: CPT

## 2021-10-05 NOTE — HISTORY OF PRESENT ILLNESS
[FreeTextEntry1] : Ms. Pritchard is an 86 year-old female with a saccular AAA at 5.0 cm who underwent  EVAR with a fenestrated graft, left renal artery coil embolization, right renal artery stent and SMA stent on July 26, 2017. \par \par She had bleeding varices in the lower extremities bilaterally that has resolved. She has bilateral lower extremity venous ulcerations. She saw Dr. Sheehan who suggested continued local wound care with Santyl. Right leg wound is healed and left leg is healing.

## 2021-10-05 NOTE — ASSESSMENT
[FreeTextEntry1] : Ms. Pritchard is an 86 year-old female with a saccular AAA at 5.0 cm who underwent  EVAR with a fenestrated graft, left renal artery coil embolization, right renal artery stent and SMA stent on July 26, 2017 by Dr. Kraft.\par \par The right leg wound is healed, left leg ulcer is healing well. I recommend daily wound care. Today she presents for an aortic duplex however was limited study secondary to bowel gas. I would send the patient for a CT scan noncontrast to evaluate her aneurysm size.the patient will followup in 3 months time

## 2021-10-13 DIAGNOSIS — L97.919 NON-PRESSURE CHRONIC ULCER OF UNSPECIFIED PART OF RIGHT LOWER LEG WITH UNSPECIFIED SEVERITY: ICD-10-CM

## 2021-10-13 DIAGNOSIS — I83.019 VARICOSE VEINS OF RIGHT LOWER EXTREMITY WITH ULCER OF UNSPECIFIED SITE: ICD-10-CM

## 2021-10-13 DIAGNOSIS — I83.029 VARICOSE VEINS OF LEFT LOWER EXTREMITY WITH ULCER OF UNSPECIFIED SITE: ICD-10-CM

## 2021-10-13 DIAGNOSIS — I83.893 VARICOSE VEINS OF BILATERAL LOWER EXTREMITIES WITH OTHER COMPLICATIONS: ICD-10-CM

## 2021-10-22 ENCOUNTER — APPOINTMENT (OUTPATIENT)
Dept: UROGYNECOLOGY | Facility: CLINIC | Age: 86
End: 2021-10-22

## 2021-11-02 ENCOUNTER — APPOINTMENT (OUTPATIENT)
Dept: VASCULAR SURGERY | Facility: CLINIC | Age: 86
End: 2021-11-02
Payer: MEDICARE

## 2021-11-02 VITALS
DIASTOLIC BLOOD PRESSURE: 75 MMHG | TEMPERATURE: 97.6 F | BODY MASS INDEX: 23.92 KG/M2 | HEART RATE: 70 BPM | HEIGHT: 63 IN | SYSTOLIC BLOOD PRESSURE: 140 MMHG | WEIGHT: 135 LBS | OXYGEN SATURATION: 97 %

## 2021-11-02 PROCEDURE — 99213 OFFICE O/P EST LOW 20 MIN: CPT

## 2021-11-02 NOTE — ASSESSMENT
[FreeTextEntry1] : Ms. Pritchard is an 86 year-old female with a saccular AAA at 5.0 cm who underwent  EVAR with a fenestrated graft, left renal artery coil embolization, right renal artery stent and SMA stent on July 26, 2017 by Dr. Kraft.\par \par The right leg wound is healed, left leg erythema is healing well. I recommend daily leg care. Wrap with ace wrap and apply Aquaphor. D/C ace wraps at night. Re apply in the morning.

## 2021-11-02 NOTE — HISTORY OF PRESENT ILLNESS
[FreeTextEntry1] : Ms. Pritchard is an 86 year-old female with a saccular AAA at 5.0 cm who underwent  EVAR with a fenestrated graft, left renal artery coil embolization, right renal artery stent and SMA stent on July 26, 2017. \par \par She had bleeding varices in the lower extremities bilaterally that has resolved. She has bilateral lower extremity venous ulcerations Her leg wounds are healed. Today she presents with some erythema in the  left medal malleolar region.

## 2021-11-15 ENCOUNTER — APPOINTMENT (OUTPATIENT)
Dept: HEMATOLOGY ONCOLOGY | Facility: CLINIC | Age: 86
End: 2021-11-15

## 2021-11-22 ENCOUNTER — OUTPATIENT (OUTPATIENT)
Dept: OUTPATIENT SERVICES | Facility: HOSPITAL | Age: 86
LOS: 1 days | Discharge: HOME | End: 2021-11-22
Payer: MEDICARE

## 2021-11-22 ENCOUNTER — APPOINTMENT (OUTPATIENT)
Dept: HEMATOLOGY ONCOLOGY | Facility: CLINIC | Age: 86
End: 2021-11-22
Payer: MEDICARE

## 2021-11-22 ENCOUNTER — LABORATORY RESULT (OUTPATIENT)
Age: 86
End: 2021-11-22

## 2021-11-22 VITALS
BODY MASS INDEX: 25.87 KG/M2 | HEIGHT: 63 IN | TEMPERATURE: 97.8 F | OXYGEN SATURATION: 98 % | WEIGHT: 146 LBS | SYSTOLIC BLOOD PRESSURE: 138 MMHG | DIASTOLIC BLOOD PRESSURE: 72 MMHG | RESPIRATION RATE: 16 BRPM | HEART RATE: 83 BPM

## 2021-11-22 DIAGNOSIS — D64.9 ANEMIA, UNSPECIFIED: ICD-10-CM

## 2021-11-22 LAB
HCT VFR BLD CALC: 32.3 %
HGB BLD-MCNC: 10.2 G/DL
MCHC RBC-ENTMCNC: 30.6 PG
MCHC RBC-ENTMCNC: 31.6 G/DL
MCV RBC AUTO: 97 FL
PLATELET # BLD AUTO: 205 K/UL
PMV BLD: 10.2 FL
RBC # BLD: 3.33 M/UL
RBC # FLD: 16.3 %
WBC # FLD AUTO: 7.74 K/UL

## 2021-11-22 PROCEDURE — 99213 OFFICE O/P EST LOW 20 MIN: CPT

## 2021-11-22 NOTE — HISTORY OF PRESENT ILLNESS
[de-identified] : Ms. ZARINA MARQUES is a 85 year old female here today for evaluation and management of abnormal labwork.\par She was referred by PCP, Dr. Barber.  Zarina is an 86yo F hard of hearing , history obtained from daughter, PMHx of AAA, kidney cancer, hypothyroidism, venous statis ulcers, Afib on eliquis. HTN, \par She is referred for chronic anemia for few years , recurrent episodes of bleeding including from  GI with c diff colitis, , and  bleeding from  varicose veins , lowest Hgb : 5 in April 2021 . refused colonoscopy .  She denies hematochezia. Last colonoscopy in 2018 ( c diff colitis ) . She is on iron and vitamin C since April , last Hgb 9.6 . MCV :101 . normal wbc and platelets .  . \par System review : She complains of generalized weakness , uses a walker to ambulate and requires assistance with personal care , she denies weight loss , fever or night sweats \par \par LAB WORKUP\par (8.24.2021) WBC 7.42, Hgb 9.6, .3, \par (7.22.2021) WBC 6.27, Hgb 9.8, .7, , BUN 28, Cr 1.6, eGFR 29, ALK Phos 129\par (12.13.2019) WBC 7.41, Hgb 14.2, MCV 98.2, \par  [de-identified] : 11/22/2021 patient returns for follow up for normocytic anemia noted since April 2021 ( previous HGb was  normal in 2019 ) . Work up shows CKD ( has one non-functioning kidney , S/P aneurysm repair . ) , ferritin is 95 on iron 65 mg daily , B12 normal , MMA elevated likely from CKD , Normal EPO level . SPEP shows weak igM paraprotein . SHe is on eliquis for atrial fibrillation . Today's she complains of flare up of back pain . She denies melena or hematochezia.

## 2021-11-22 NOTE — PHYSICAL EXAM
[Normal] : normoactive bowel sounds, soft and nontender, no hepatosplenomegaly or masses appreciated [de-identified] : slightly pale , hard of hearing in no acute distress.  [de-identified] : grade 2/6 ESM [de-identified] : no organomegaly .

## 2021-11-22 NOTE — ASSESSMENT
[FreeTextEntry1] : #85 year old female with anemia likely secondary to GI and varicose vein bleeding +/- CKD .\par #IgM paraproteinemia .\par # History of protein S deficiency ?\par \par Today's Hgb is 10.2 and stable .\par Plan : continue on po iron . \par           repeat ferritin , Immunoglobulin levels , free light chains. \par           continue to monitor CBC with home lab , follow up in 2 months . \par \par      \par

## 2021-11-23 LAB
DEPRECATED KAPPA LC FREE/LAMBDA SER: 1.77 RATIO
FERRITIN SERPL-MCNC: 116 NG/ML
IGA SER QL IEP: 331 MG/DL
IGG SER QL IEP: 1132 MG/DL
IGM SER QL IEP: 210 MG/DL
KAPPA LC CSF-MCNC: 4.76 MG/DL
KAPPA LC SERPL-MCNC: 8.44 MG/DL
LDH SERPL-CCNC: 309

## 2021-11-29 LAB — PROT S FREE PPP-ACNC: 33 %

## 2022-01-20 ENCOUNTER — APPOINTMENT (OUTPATIENT)
Dept: VASCULAR SURGERY | Facility: CLINIC | Age: 87
End: 2022-01-20
Payer: MEDICARE

## 2022-01-20 VITALS
BODY MASS INDEX: 24.8 KG/M2 | SYSTOLIC BLOOD PRESSURE: 140 MMHG | DIASTOLIC BLOOD PRESSURE: 70 MMHG | HEART RATE: 85 BPM | WEIGHT: 140 LBS

## 2022-01-20 PROCEDURE — 93978 VASCULAR STUDY: CPT

## 2022-01-20 PROCEDURE — 99214 OFFICE O/P EST MOD 30 MIN: CPT

## 2022-01-20 NOTE — ASSESSMENT
[FreeTextEntry1] : Ms. Pritchard is an 86 year-old female with a saccular AAA at 5.0 cm who underwent  EVAR with a fenestrated graft, left renal artery coil embolization, right renal artery stent and SMA stent on July 26, 2017 by Dr. Kraft.\par \par Aortic duplex today showed patent endograft with residual sac at 2.6 cm, patent right renal and SMA arteries.\par \par Lower extremity wounds are healed bilaterally. I recommend hydrocortisone to the area of dermatitis at the left medial malleolar region. I will see her back in six months time for aortic duplex.

## 2022-01-20 NOTE — DATA REVIEWED
[FreeTextEntry1] : I performed an arterial duplex which was medically necessary to evaluate for patency of the endograft. It showed patent endograft, with residual sac at 2.6 cm, patent SMA and right renal artery.\par

## 2022-02-03 ENCOUNTER — LABORATORY RESULT (OUTPATIENT)
Age: 87
End: 2022-02-03

## 2022-02-03 ENCOUNTER — APPOINTMENT (OUTPATIENT)
Dept: HEMATOLOGY ONCOLOGY | Facility: CLINIC | Age: 87
End: 2022-02-03
Payer: MEDICARE

## 2022-02-03 ENCOUNTER — OUTPATIENT (OUTPATIENT)
Dept: OUTPATIENT SERVICES | Facility: HOSPITAL | Age: 87
LOS: 1 days | Discharge: HOME | End: 2022-02-03

## 2022-02-03 VITALS
WEIGHT: 140 LBS | TEMPERATURE: 96.3 F | DIASTOLIC BLOOD PRESSURE: 80 MMHG | SYSTOLIC BLOOD PRESSURE: 132 MMHG | BODY MASS INDEX: 24.8 KG/M2 | HEART RATE: 80 BPM | HEIGHT: 63 IN

## 2022-02-03 PROCEDURE — 99213 OFFICE O/P EST LOW 20 MIN: CPT

## 2022-02-04 LAB
DIRECT COOMBS: NORMAL
FERRITIN SERPL-MCNC: 138 NG/ML
HAPTOGLOB SERPL-MCNC: 154 MG/DL
HCT VFR BLD CALC: 35.7 %
HGB BLD-MCNC: 11.5 G/DL
MCHC RBC-ENTMCNC: 30.4 PG
MCHC RBC-ENTMCNC: 32.2 G/DL
MCV RBC AUTO: 94.4 FL
PLATELET # BLD AUTO: 238 K/UL
PMV BLD: 11 FL
RBC # BLD: 3.78 M/UL
RBC # FLD: 17.2 %
RETICS # AUTO: 1.8 %
RETICS AGGREG/RBC NFR: 67.7 K/UL
WBC # FLD AUTO: 7.91 K/UL

## 2022-02-05 NOTE — PHYSICAL EXAM
[Normal] : normoactive bowel sounds, soft and nontender, no hepatosplenomegaly or masses appreciated [de-identified] : slightly pale , hard of hearing in no acute distress.  [de-identified] : grade 2/6 ESM irregular [de-identified] : no organomegaly .

## 2022-02-05 NOTE — HISTORY OF PRESENT ILLNESS
[de-identified] : Ms. ZARINA MARQUES is a 85 year old female here today for evaluation and management of abnormal labwork.\par She was referred by PCP, Dr. Barber.  Zarina is an 84yo F hard of hearing , history obtained from daughter, PMHx of AAA, kidney cancer, hypothyroidism, venous statis ulcers, Afib on eliquis. HTN, \par She is referred for chronic anemia for few years , recurrent episodes of bleeding including from  GI with c diff colitis, , and  bleeding from  varicose veins , lowest Hgb : 5 in April 2021 . refused colonoscopy .  She denies hematochezia. Last colonoscopy in 2018 ( c diff colitis ) . She is on iron and vitamin C since April , last Hgb 9.6 . MCV :101 . normal wbc and platelets .  . \par System review : She complains of generalized weakness , uses a walker to ambulate and requires assistance with personal care , she denies weight loss , fever or night sweats \par \par LAB WORKUP\par (8.24.2021) WBC 7.42, Hgb 9.6, .3, \par (7.22.2021) WBC 6.27, Hgb 9.8, .7, , BUN 28, Cr 1.6, eGFR 29, ALK Phos 129\par (12.13.2019) WBC 7.41, Hgb 14.2, MCV 98.2, \par  [de-identified] : 11/22/2021 patient returns for follow up for normocytic anemia noted since April 2021 ( previous HGb was  normal in 2019 ) . Work up shows CKD ( has one non-functioning kidney , S/P aneurysm repair . ) , ferritin is 95 on iron 65 mg daily , B12 normal , MMA elevated likely from CKD , Normal EPO level . SPEP shows weak igM paraprotein . SHe is on eliquis for atrial fibrillation . Today's she complains of flare up of back pain . She denies melena or hematochezia. \par \par 2/3/2022 Patient returns for follow up for chronic anemia , no new complaints or symptoms reported . last Hgb is 11 . ferritin >100 . renal function is stable , IGM around 200 .\par LDH was elevated . \par She continues on eliquis, iron and vitamin C daily .

## 2022-02-05 NOTE — ASSESSMENT
[FreeTextEntry1] : #85 year old female with anemia likely secondary to GI and varicose vein bleeding +/- CKD .\par #IgM paraproteinemia .\par # History of protein S deficiency ?\par \par Today's Hgb  improved further to \par Plan : continue on po iron . \par           repeat ferritin , Immunoglobulin levels , free light chains. \par           continue to monitor CBC with home lab , follow up in 2 months . \par \par      \par

## 2022-02-07 DIAGNOSIS — D64.9 ANEMIA, UNSPECIFIED: ICD-10-CM

## 2022-04-05 ENCOUNTER — APPOINTMENT (OUTPATIENT)
Dept: VASCULAR SURGERY | Facility: CLINIC | Age: 87
End: 2022-04-05

## 2022-06-19 NOTE — ED ADULT NURSE NOTE - NSIMPLEMENTINTERV_GEN_ALL_ED
Implemented All Fall with Harm Risk Interventions:  [place] to call system. Call bell, personal items and telephone within reach. Instruct patient to call for assistance. Room bathroom lighting operational. Non-slip footwear when patient is off stretcher. Physically safe environment: no spills, clutter or unnecessary equipment. Stretcher in lowest position, wheels locked, appropriate side rails in place. Provide visual cue, wrist band, yellow gown, etc. Monitor gait and stability. Monitor for mental status changes and reorient to person, place, and time. Review medications for side effects contributing to fall risk. Reinforce activity limits and safety measures with patient and family. Provide visual clues: red socks. yes

## 2022-06-21 ENCOUNTER — APPOINTMENT (OUTPATIENT)
Dept: VASCULAR SURGERY | Facility: CLINIC | Age: 87
End: 2022-06-21
Payer: MEDICARE

## 2022-06-21 VITALS — HEIGHT: 63 IN

## 2022-06-21 VITALS — BODY MASS INDEX: 23.03 KG/M2 | WEIGHT: 130 LBS

## 2022-06-21 VITALS — SYSTOLIC BLOOD PRESSURE: 101 MMHG | DIASTOLIC BLOOD PRESSURE: 72 MMHG

## 2022-06-21 DIAGNOSIS — I71.4 ABDOMINAL AORTIC ANEURYSM, W/OUT RUPTURE: ICD-10-CM

## 2022-06-21 DIAGNOSIS — I83.029 VARICOSE VEINS OF LEFT LOWER EXTREMITY WITH ULCER OF UNSPECIFIED SITE: ICD-10-CM

## 2022-06-21 DIAGNOSIS — L97.929 VARICOSE VEINS OF LEFT LOWER EXTREMITY WITH ULCER OF UNSPECIFIED SITE: ICD-10-CM

## 2022-06-21 PROCEDURE — 99214 OFFICE O/P EST MOD 30 MIN: CPT

## 2022-06-21 PROCEDURE — 93978 VASCULAR STUDY: CPT

## 2022-06-21 NOTE — HISTORY OF PRESENT ILLNESS
[FreeTextEntry1] : Ms. Pritchard is an 86 year-old female with a saccular AAA at 5.0 cm who underwent  EVAR with a fenestrated graft, left renal artery coil embolization, right renal artery stent and SMA stent on July 26, 2017 by Dr. Kraft.\par \par She had bleeding varices in the lower extremities bilaterally that has resolved. She had bilateral lower extremity venous ulcerations Her leg wounds are healed. Today she presents with some erythema in the  left medal malleolar region.

## 2022-07-12 ENCOUNTER — APPOINTMENT (OUTPATIENT)
Dept: HEMATOLOGY ONCOLOGY | Facility: CLINIC | Age: 87
End: 2022-07-12

## 2022-07-18 ENCOUNTER — APPOINTMENT (OUTPATIENT)
Dept: HEMATOLOGY ONCOLOGY | Facility: CLINIC | Age: 87
End: 2022-07-18

## 2022-07-18 ENCOUNTER — OUTPATIENT (OUTPATIENT)
Dept: OUTPATIENT SERVICES | Facility: HOSPITAL | Age: 87
LOS: 1 days | Discharge: HOME | End: 2022-07-18

## 2022-07-18 ENCOUNTER — LABORATORY RESULT (OUTPATIENT)
Age: 87
End: 2022-07-18

## 2022-07-18 VITALS
DIASTOLIC BLOOD PRESSURE: 67 MMHG | TEMPERATURE: 97.8 F | BODY MASS INDEX: 25.69 KG/M2 | SYSTOLIC BLOOD PRESSURE: 140 MMHG | HEART RATE: 70 BPM | WEIGHT: 145 LBS

## 2022-07-18 DIAGNOSIS — D64.9 ANEMIA, UNSPECIFIED: ICD-10-CM

## 2022-07-18 LAB
HCT VFR BLD CALC: 35.4 %
HGB BLD-MCNC: 12 G/DL
MCHC RBC-ENTMCNC: 32.1 PG
MCHC RBC-ENTMCNC: 33.9 G/DL
MCV RBC AUTO: 94.7 FL
PLATELET # BLD AUTO: 216 K/UL
PMV BLD: 11.3 FL
RBC # BLD: 3.74 M/UL
RBC # FLD: 15.7 %
WBC # FLD AUTO: 7.9 K/UL

## 2022-07-18 PROCEDURE — 99214 OFFICE O/P EST MOD 30 MIN: CPT

## 2022-07-19 DIAGNOSIS — R79.89 OTHER SPECIFIED ABNORMAL FINDINGS OF BLOOD CHEMISTRY: ICD-10-CM

## 2022-07-19 DIAGNOSIS — D64.9 ANEMIA, UNSPECIFIED: ICD-10-CM

## 2022-07-19 LAB
ALBUMIN SERPL ELPH-MCNC: 3.6 G/DL
ALP BLD-CCNC: 920 U/L
ALT SERPL-CCNC: 31 U/L
ANION GAP SERPL CALC-SCNC: 14 MMOL/L
AST SERPL-CCNC: 38 U/L
BILIRUB SERPL-MCNC: 0.7 MG/DL
BUN SERPL-MCNC: 39 MG/DL
CALCIUM SERPL-MCNC: 10.5 MG/DL
CHLORIDE SERPL-SCNC: 104 MMOL/L
CO2 SERPL-SCNC: 23 MMOL/L
CREAT SERPL-MCNC: 1.7 MG/DL
EGFR: 29 ML/MIN/1.73M2
GGT SERPL-CCNC: 349 U/L
GLUCOSE SERPL-MCNC: 71 MG/DL
LPL SERPL-CCNC: 53 U/L
POTASSIUM SERPL-SCNC: 4.2 MMOL/L
PROT SERPL-MCNC: 6.7 G/DL
SODIUM SERPL-SCNC: 141 MMOL/L

## 2022-07-22 NOTE — ASSESSMENT
[FreeTextEntry1] : #85 year old female with anemia likely secondary to GI and varicose vein bleeding +/- CKD .\par #Mild IgM paraproteinemia .\par # History of protein S deficiency ?\par # atrial fibrillation on eliquis . \par # abnormal LFTs. \par \par Today's Hgb  improved further to \par Plan : repeat LFTs. GGT . \par          consider abdominal sono ( Last CT from 2018 ) \par \par      \par

## 2022-07-22 NOTE — PHYSICAL EXAM
[Normal] : normoactive bowel sounds, soft and nontender, no hepatosplenomegaly or masses appreciated [de-identified] : slightly pale , hard of hearing in no acute distress.  [de-identified] : grade 2/6 ESM irregular [de-identified] : legs wrapped.  [de-identified] : no organomegaly .

## 2022-07-22 NOTE — HISTORY OF PRESENT ILLNESS
[de-identified] : \par Ms. ZARINA MARQUES is a 85 year old female here today for evaluation and management of abnormal labwork.\par She was referred by PCP, Dr. Barber. Zarina is an 86yo F hard of hearing , history obtained from daughter, PMHx of AAA, kidney cancer, hypothyroidism, venous statis ulcers, Afib on eliquis. HTN, \par She is referred for chronic anemia for few years , recurrent episodes of bleeding including from GI with c diff colitis, , and bleeding from varicose veins , lowest Hgb : 5 in April 2021. refused colonoscopy. She denies hematochezia. Last colonoscopy in 2018 ( c diff colitis ). She is on iron and vitamin C since April , last Hgb 9.6. MCV :101. normal wbc and platelets.. \par System review : She complains of generalized weakness , uses a walker to ambulate and requires assistance with personal care , she denies weight loss , fever or night sweats \par \par LAB WORKUP\par (8.24.2021) WBC 7.42, Hgb 9.6, .3, \par (7.22.2021) WBC 6.27, Hgb 9.8, .7, , BUN 28, Cr 1.6, eGFR 29, ALK Phos 129\par (12.13.2019) WBC 7.41, Hgb 14.2, MCV 98.2, \par  [de-identified] : 11/22/2021 patient returns for follow up for normocytic anemia noted since April 2021 ( previous HGb was  normal in 2019 ) . Work up shows CKD ( has one non-functioning kidney , S/P aneurysm repair . ) , ferritin is 95 on iron 65 mg daily , B12 normal , MMA elevated likely from CKD , Normal EPO level . SPEP shows weak igM paraprotein . SHe is on eliquis for atrial fibrillation . Today's she complains of flare up of back pain . She denies melena or hematochezia. \par \par 2/3/2022 Patient returns for follow up for chronic anemia , no new complaints or symptoms reported . last Hgb is 11 . ferritin >100 . renal function is stable , IGM around 200 .\par LDH was elevated . \par She continues on eliquis, iron and vitamin C daily . \par \par 7/18/2022 Patient returns for follow up , recent blood work shows stable Hgb , abnormal LFTs since 5/2022 . she denies abdominal pain , nausea or vomiting or fever . no bleeding from leg varices, she continues on local care , compression  for lower extremity ulcers .\par \par 5/17/2022 alkaline phosphatase : 734 \par 6/9/2022  alkaline phosphatase : 829  SGOT : 66 SGPT : 49 daniela : normal

## 2022-09-13 ENCOUNTER — OUTPATIENT (OUTPATIENT)
Dept: OUTPATIENT SERVICES | Facility: HOSPITAL | Age: 87
LOS: 1 days | Discharge: HOME | End: 2022-09-13

## 2022-09-13 ENCOUNTER — APPOINTMENT (OUTPATIENT)
Dept: VASCULAR SURGERY | Facility: CLINIC | Age: 87
End: 2022-09-13

## 2022-09-13 DIAGNOSIS — R07.9 CHEST PAIN, UNSPECIFIED: ICD-10-CM

## 2022-09-13 DIAGNOSIS — I87.2 VENOUS INSUFFICIENCY (CHRONIC) (PERIPHERAL): ICD-10-CM

## 2022-09-13 PROCEDURE — 99213 OFFICE O/P EST LOW 20 MIN: CPT

## 2022-09-13 PROCEDURE — 71046 X-RAY EXAM CHEST 2 VIEWS: CPT | Mod: 26

## 2022-09-13 RX ORDER — TRIAMCINOLONE ACETONIDE 1 MG/G
0.1 CREAM TOPICAL DAILY
Qty: 1 | Refills: 2 | Status: ACTIVE | COMMUNITY
Start: 2022-09-13 | End: 1900-01-01

## 2022-09-13 NOTE — HISTORY OF PRESENT ILLNESS
[FreeTextEntry1] : Ms. Pritchard is an 86 year-old female with a saccular AAA at 5.0 cm who underwent EVAR with a fenestrated graft, left renal artery coil embolization, right renal artery stent and SMA stent on July 26, 2017 by Dr. Kraft.\par \par She presents today for discoloration to legs, no leg swelling, no pain.

## 2022-09-13 NOTE — ASSESSMENT
[FreeTextEntry1] : Ms. Pritchard is an 86 year-old female with a saccular AAA at 5.0 cm who underwent EVAR with a fenestrated graft, left renal artery coil embolization, right renal artery stent and SMA stent on July 26, 2017 by Dr. Kraft.\par \par She presents today for discoloration to legs, no leg swelling, no pain.\par \par I recommend steroid cream and Dermatology consult.

## 2022-10-11 NOTE — ED CDU PROVIDER INITIAL DAY NOTE - NSTIMEPROVIDERCAREINITIATE_GEN_ER
Problem: Self Harm/Suicidality  Goal: Will have no self-injury during hospital stay  Description: INTERVENTIONS:  1. Q 30 MINUTES: Routine safety checks  2. Q SHIFT & PRN: Assess risk to determine if routine checks are adequate to maintain patient safety  Outcome: Progressing     Problem: Depression  Goal: Will be euthymic at discharge  Description: INTERVENTIONS:  1. Administer medication as ordered  2. Provide emotional support via 1:1 interaction with staff  3. Encourage involvement in milieu/groups/activities  4.  Monitor for social isolation  Outcome: Progressing 30-Apr-2021 18:14

## 2022-11-21 ENCOUNTER — APPOINTMENT (OUTPATIENT)
Dept: HEMATOLOGY ONCOLOGY | Facility: CLINIC | Age: 87
End: 2022-11-21

## 2022-12-20 ENCOUNTER — APPOINTMENT (OUTPATIENT)
Dept: VASCULAR SURGERY | Facility: CLINIC | Age: 87
End: 2022-12-20

## 2023-04-27 NOTE — ED PROVIDER NOTE - CPE EDP MUSC NORM
Consent: The patient's consent was obtained including but not limited to risks of crusting, scabbing, blistering, scarring, darker or lighter pigmentary change, recurrence, incomplete removal and infection. Show Aperture Variable?: Yes Number Of Freeze-Thaw Cycles: 2 freeze-thaw cycles Render Post-Care Instructions In Note?: no Post-Care Instructions: I reviewed with the patient in detail post-care instructions. Patient is to wear sunprotection, and avoid picking at any of the treated lesions. Pt may apply Vaseline to crusted or scabbing areas. Application Tool (Optional): Cry-AC Duration Of Freeze Thaw-Cycle (Seconds): 0 Detail Level: Detailed Spray Paint Text: The liquid nitrogen was applied to the skin utilizing a spray paint frosting technique. Medical Necessity Clause: This procedure was medically necessary because the lesions that were treated were: Medical Necessity Information: It is in your best interest to select a reason for this procedure from the list below. All of these items fulfill various CMS LCD requirements except the new and changing color options. normal...

## 2023-11-15 NOTE — ED PROVIDER NOTE - IV ALTEPLASE EXCL REL HIDDEN
No answer/voicemail  full  
----- Message from Emely Tavarez sent at 11/3/2023  1:56 PM CDT -----  Regarding: self [phone]  .Type:  Patient Returning Call    Who Called: self     Who Left Message for Patient: Teodora    Does the patient know what this is regarding? PT stated that she has kidney disease and requesting to know if it's safe for her to take the amoxicillin-clavulanate 500-125mg (AUGMENTIN) 500-125 mg Tab    Would the patient rather a call back or a response via [facility]?  Call back     Best Call Back Number: .[phone]        
No answer/no voicemail   
show

## 2023-12-05 NOTE — H&P ADULT - ATTENDING COMMENTS
CST   84 yo female with PMH of HTN, Atrial Fibrillation on Eliquis, hypothyroidism, CHF, AAA s/p EVAR (2017), left renal artery coil embolization, right renal artery stent and SMA stent (2017), with recent C. diff colitis started on PO Vancomycin by PCP on Monday was brought  to ED for bright red blood per rectum x1 day. Per daughter at bedside, patients toilet was filled with bright red blood on 4 separate instances of diarrhea yesterday. Patient was having non-bloody diarrhea for 3 weeks prior to being started on Vancomycin. Patient also is c/o worsening edema and erythema on her right foot. She denies fever, chills, cough, chest pain or SOB. In the ED /57, HR 70, T 98F, Labs showed Hb 7.9    PHYSICAL EXAM:  GENERAL: NAD, well-developed.  HEAD:  Atraumatic, Normocephalic.  EYES: EOMI, PERRLA, conjunctiva and sclera clear.  NECK: Supple, No JVD.  CHEST/LUNG: Clear to auscultation bilaterally; No wheeze.  HEART: Regular rate and rhythm; S1 S2.   ABDOMEN: Soft, Nontender, Nondistended; Bowel sounds present.  EXTREMITIES:  2+ Peripheral Pulses, LE edema 1+, right foot erythema and tenderness, bilateral tophi noted on both fingers.   PSYCH: AAOx3.  NEUROLOGY: non-focal.  SKIN: No rashes or lesions.    A/p:   Acute lower GI bleeding:   Acute blood loss anemia:   Recent diagnosis of Acute C.Diff colitis. Still with diarrhea.   Possibly from Colitis.   Baseline 12 per daughter a year ago.   Monitor Hb q 12hrs. Transfuse RBC to Hb >7 GI consult.   Continue Vancomycin PO, ID consult.   Start on Pantoprazole 40mg Iv BID.     CODY on CKD stage 3:   Cr was 2.2 on 4/1/21, was 1.7 in 2019.   Likely pre-renal, Send UA, send urine Cr, Na  Hold Valsartan if worsening renal function.   Encourage oral hydration, will hold IV fluid due to LE edema.     Chronic HFpEF:   Stable. Send Echo.   Continue Lasix PO.     Chronic Atrial Fibrillation:   Rate is controlled, continue Cardizem.   Hold Eliquis due to GI bleeding.     Right foot pain and erythema: history of gout.   Start on Prednisone 30mg PO daily.     Elevated Troponin: likely leak from CKD/CODY.   Full CODE. Discussed with her daughter at bedside.

## 2024-07-30 NOTE — HISTORY OF PRESENT ILLNESS
no lesions, no deformities, no traumatic injuries, no significant scars are present, chest wall non-tender, no masses present, breathing is unlabored without accessory muscle use,normal breath sounds [FreeTextEntry1] : Ms. 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 \par \par She presents for follow up, was seen in ED last week for bleeding varix and had suture placed in the LLE.

## 2024-10-22 NOTE — ED ADULT NURSE NOTE - ALCOHOL PRE SCREEN (AUDIT - C)
What Type Of Note Output Would You Prefer (Optional)?: Bullet Format How Severe Is Your Skin Lesion?: mild Has Your Skin Lesion Been Treated?: not been treated Is This A New Presentation, Or A Follow-Up?: Skin Lesion Statement Selected

## 2025-02-07 NOTE — PROGRESS NOTE ADULT - SUBJECTIVE AND OBJECTIVE BOX
----- Message from LEE Koehler sent at 2/7/2025 10:25 AM CST -----  Rajat Charles,    Can we schedule Ms. De Luna for a colpo with one of OB/GYN's? 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    Thanks,     HOMER Koehler  ----- Message -----  From: Eriberto, [employer] [facility] Interface  Sent: 1/30/2025  11:08 AM CST  To: LEE Hunt   86 y/o F PMHx HLD, HTN, A-fib on Eliquis, hypothyroidism, CHF, AAA s/p EVAR (2017), left renal artery coil embolization, right renal artery stent and SMA stent (2017), with recent C. diff diagnosis started on PO Vancomycin by PCP on Monday, has been compliant, presents to ED with bright red blood per rectum x1 day.   Patient is accompanied by daughter at bedside who states that the patients toilet was filled with bright red blood on 4 separate instances of diarrhea yesterday. Patient was having non-bloody diarrhea for 3 weeks prior to being started on Vancomycin.    In ER, Vitals were stable. Labs were remarkable for Normocytic anemia, Hyponatremia, hypokalemia, elevated Pro BNP. US LE was unremarkable. Echo showed Pulmonary HTN, Severe TR. Patient has been admitted for Lower GI bleed. GI was consulted but patient refused Colonoscopy. ID  recommended to treat with Vancomycin for 14 days. Patient is having formed stools now.     Patient was seen at the bedside, stable. with no acute issues.     Constitutional: well-developed; well-groomed; well-nourished  Eyes :conjunctiva clear   Neck supple; no JVD  Back normal shape; ROM intact  Respiratory normal; airway patent; breath sounds equal; good air movement  Cardiovascular; regular rate and rhythm  no rub  no murmur  normal PMI  Gastrointestinal :soft, Nontender, No distension, NBS  Neurological: alert and oriented x 3; responds to pain; responds to verbal commands  Skin; warm and dry; color normal  Musculoskeletal ::normal; ROM intact; no joint swelling; no joint erythema; no joint warmth